# Patient Record
Sex: MALE | Race: WHITE | NOT HISPANIC OR LATINO | Employment: OTHER | URBAN - NONMETROPOLITAN AREA
[De-identification: names, ages, dates, MRNs, and addresses within clinical notes are randomized per-mention and may not be internally consistent; named-entity substitution may affect disease eponyms.]

---

## 2017-09-25 ENCOUNTER — OFFICE VISIT (OUTPATIENT)
Dept: PAIN MEDICINE | Facility: CLINIC | Age: 47
End: 2017-09-25

## 2017-09-25 VITALS
BODY MASS INDEX: 41.97 KG/M2 | HEIGHT: 72 IN | SYSTOLIC BLOOD PRESSURE: 130 MMHG | WEIGHT: 309.9 LBS | DIASTOLIC BLOOD PRESSURE: 90 MMHG

## 2017-09-25 DIAGNOSIS — M54.5 CHRONIC LOW BACK PAIN, UNSPECIFIED BACK PAIN LATERALITY, WITH SCIATICA PRESENCE UNSPECIFIED: ICD-10-CM

## 2017-09-25 DIAGNOSIS — Z98.890 HISTORY OF BACK SURGERY: ICD-10-CM

## 2017-09-25 DIAGNOSIS — M54.2 CERVICALGIA: ICD-10-CM

## 2017-09-25 DIAGNOSIS — M51.36 DDD (DEGENERATIVE DISC DISEASE), LUMBAR: Primary | ICD-10-CM

## 2017-09-25 DIAGNOSIS — G89.29 CHRONIC LOW BACK PAIN, UNSPECIFIED BACK PAIN LATERALITY, WITH SCIATICA PRESENCE UNSPECIFIED: ICD-10-CM

## 2017-09-25 DIAGNOSIS — M54.16 LUMBAR RADICULOPATHY: ICD-10-CM

## 2017-09-25 PROCEDURE — 99204 OFFICE O/P NEW MOD 45 MIN: CPT | Performed by: NURSE PRACTITIONER

## 2017-09-25 RX ORDER — LISINOPRIL AND HYDROCHLOROTHIAZIDE 20; 12.5 MG/1; MG/1
1 TABLET ORAL
COMMUNITY
Start: 2017-05-26 | End: 2020-02-18

## 2017-09-25 RX ORDER — OMEPRAZOLE 40 MG/1
40 CAPSULE, DELAYED RELEASE ORAL
COMMUNITY
Start: 2017-05-26 | End: 2020-02-18

## 2017-09-25 RX ORDER — TIZANIDINE 4 MG/1
2 TABLET ORAL 3 TIMES DAILY PRN
Qty: 90 TABLET | Refills: 1 | Status: SHIPPED | OUTPATIENT
Start: 2017-09-25 | End: 2017-10-25

## 2017-09-25 RX ORDER — DICLOFENAC SODIUM 75 MG/1
75 TABLET, DELAYED RELEASE ORAL 2 TIMES DAILY
Qty: 60 TABLET | Refills: 0 | Status: SHIPPED | OUTPATIENT
Start: 2017-09-25 | End: 2017-10-25

## 2017-09-25 RX ORDER — VENLAFAXINE 75 MG/1
75 TABLET ORAL
COMMUNITY
Start: 2017-05-26 | End: 2021-02-25

## 2017-09-25 RX ORDER — OXYCODONE AND ACETAMINOPHEN 7.5; 325 MG/1; MG/1
1 TABLET ORAL
COMMUNITY
End: 2021-02-25

## 2017-09-25 RX ORDER — AMLODIPINE BESYLATE 10 MG/1
10 TABLET ORAL
COMMUNITY
Start: 2017-05-26

## 2017-09-25 NOTE — PROGRESS NOTES
Joel Patton is a 46 y.o. male.   1970    HPI:   Location: lower back, left hip and left leg  Quality: burning, aching and throbbing  Severity: 4/10  Timing: constant  Alleviating: nothing  Aggravating: increased activity       Patient referred to pain management via Dr. Maria related to chronic lower back pain with reported left leg involvement. Pt with chronic lower back pain for 15 years- posterior left leg pain. Pt had MRI 2009- DDD and bulging disc. No loss of bowel or bladder.  Pt seen Emma Johnson. Laminectomy. Pt with opiate medications and PT. Eventually, left leg pain became worst. Pt followed up with Dr. Booker in March 2017. MRI performed and surgery 2nd laminectomy. Pt is interested in lumbar injections. Secondary, Pt reports cervical fusion via Dr. Chi. Pt with no complaints at this time. Explained in great detail history and physical, examination, ancillary physician notes and images reviewed, and pain management options.        The following portions of the patient's history were reviewed by me and updated as appropriate: allergies, current medications, past family history, past medical history, past social history, past surgical history and problem list.    Past Medical History:   Diagnosis Date   • Anxiety    • Depression    • High blood pressure    • Sleep apnea        Social History     Social History   • Marital status: Single     Spouse name: N/A   • Number of children: N/A   • Years of education: N/A     Occupational History   • Not on file.     Social History Main Topics   • Smoking status: Light Tobacco Smoker   • Smokeless tobacco: Never Used   • Alcohol use No   • Drug use: No   • Sexual activity: Defer     Other Topics Concern   • Not on file     Social History Narrative   • No narrative on file       Family History   Problem Relation Age of Onset   • Hypertension Mother    • Hypertension Father    • Cancer Paternal Grandfather    • Heart defect Paternal Grandfather           Current Outpatient Prescriptions:   •  amLODIPine (NORVASC) 10 MG tablet, Take 10 mg by mouth., Disp: , Rfl:   •  lisinopril-hydrochlorothiazide (PRINZIDE,ZESTORETIC) 20-12.5 MG per tablet, Take 1 tablet by mouth., Disp: , Rfl:   •  omeprazole (priLOSEC) 40 MG capsule, Take 40 mg by mouth., Disp: , Rfl:   •  oxyCODONE-acetaminophen (PERCOCET) 7.5-325 MG per tablet, Take 1 tablet by mouth., Disp: , Rfl:   •  venlafaxine (EFFEXOR) 75 MG tablet, Take 75 mg by mouth., Disp: , Rfl:   •  diclofenac (VOLTAREN) 75 MG EC tablet, Take 1 tablet by mouth 2 (Two) Times a Day for 30 days., Disp: 60 tablet, Rfl: 0  •  tiZANidine (ZANAFLEX) 4 MG tablet, Take 0.5 tablets by mouth 3 (Three) Times a Day As Needed for Muscle Spasms for up to 30 days., Disp: 90 tablet, Rfl: 1    No Known Allergies    Review of Systems   Musculoskeletal: Positive for back pain (LOWER).        Left hip pain left leg pain     All other systems reviewed and are negative.    All review of systems reviewed and negative except for above.    Physical Exam   Constitutional: He is oriented to person, place, and time. He appears well-developed and well-nourished.   HENT:   Head: Normocephalic and atraumatic.   Eyes: Conjunctivae and EOM are normal. Pupils are equal, round, and reactive to light.   Neck: Normal range of motion. Neck supple.   Cardiovascular: Normal rate and regular rhythm.    Pulmonary/Chest: Effort normal and breath sounds normal.   Abdominal: Soft. Bowel sounds are normal.   Musculoskeletal:        Cervical back: He exhibits decreased range of motion ( mild ext with cervical facet loading) and tenderness.        Lumbar back: He exhibits decreased range of motion ( flex < 90 deg and ext 10 deg with david facet loading) and tenderness.        Back:    Neurological: He is alert and oriented to person, place, and time. He displays normal reflexes. A sensory deficit ( occ left foot numbness) is present. No cranial nerve deficit. He exhibits  normal muscle tone. Coordination and gait normal.   Reflex Scores:       Tricep reflexes are 1+ on the right side and 1+ on the left side.       Bicep reflexes are 2+ on the right side and 2+ on the left side.       Brachioradialis reflexes are 2+ on the right side and 2+ on the left side.       Patellar reflexes are 1+ on the right side and 1+ on the left side.       Achilles reflexes are 1+ on the right side and 1+ on the left side.  Mild SLR left leg  Upper arm strength 5/5  Lower leg strength 5/5- left sided posterior pain down leg reported   Skin: Skin is warm and dry.   Psychiatric: He has a normal mood and affect. His behavior is normal. He expresses no homicidal and no suicidal ideation. He expresses no suicidal plans and no homicidal plans.   Nursing note and vitals reviewed.      Joel was seen today for back pain, hip pain and leg pain.    Diagnoses and all orders for this visit:    DDD (degenerative disc disease), lumbar  -     IR epidural block injection lumbar spine; Future  -     diclofenac (VOLTAREN) 75 MG EC tablet; Take 1 tablet by mouth 2 (Two) Times a Day for 30 days.  -     tiZANidine (ZANAFLEX) 4 MG tablet; Take 0.5 tablets by mouth 3 (Three) Times a Day As Needed for Muscle Spasms for up to 30 days.    Lumbar radiculopathy  -     IR epidural block injection lumbar spine; Future  -     diclofenac (VOLTAREN) 75 MG EC tablet; Take 1 tablet by mouth 2 (Two) Times a Day for 30 days.  -     tiZANidine (ZANAFLEX) 4 MG tablet; Take 0.5 tablets by mouth 3 (Three) Times a Day As Needed for Muscle Spasms for up to 30 days.    Chronic low back pain, unspecified back pain laterality, with sciatica presence unspecified  -     IR epidural block injection lumbar spine; Future  -     diclofenac (VOLTAREN) 75 MG EC tablet; Take 1 tablet by mouth 2 (Two) Times a Day for 30 days.  -     tiZANidine (ZANAFLEX) 4 MG tablet; Take 0.5 tablets by mouth 3 (Three) Times a Day As Needed for Muscle Spasms for up to 30  days.    History of back surgery    Cervicalgia        Medication: Discussed Voltaren 75mg EC bid and Zanaflex 2mg TID PRN, discussed and ordered.  Risk and precautions have been fully explained to the patient, who indicates understanding. Pt informed to not taken Motrin with Voltaren. No opiates will be Rx today- Will attempt non-opiate interventions first.        Interventional: I have discussed and ordered Transforminal Lumbar Epidural Steroid Injection Left sided L4-L5, L5-S1  I have discussed the risks and benefits of the procedure with the patient.  JEROMY and any neurological impairments discussed with patient that may need of emergency evaluation.      I have reviewed ancillary notes and images for today's examination;    2/8/2017 :MRI  1.  At the L5-S1 level, there is a 5.0 x 10.0 mm left-sided extruded disc fragment with investing epidural scar.  The impact on the left S1 nerve root is uncertain and needs clinical correlation.    2.  No spinal stenosis, right-sided nerve root compression, or arachnoiditis.   Result Narrative   Indication:  Pain in low back and left leg.  No specific injury.  Back surgery 5-6 years ago.    MRI, L-spine with/without 10 mL Gadovist:  The lumbar vertebrae have a normal signal and appearance.  The L1-2 disc level is unremarkable.  At the L2-3 level, there is a shallow central disc protrusion with slight superior migration behind the inferior   endplate of L2; no nerve root compression.    The L3-4 level is unremarkable.    The L4-5 level has mild degenerative change in the facets without complications.    At the L5-S1 level, there is a central disc protrusion and, in addition, an extruded left disc fragment measuring 1.0 x 8.0 mm that has investing epidural scar.  The proximal S1 nerve root is not separable from the disc fragment but below this there is   no abnormal enhancement and the nerve root appears normal.  There is no clumping or abnormal enhancement in the cauda  equina.  The conus has a normal position and appearance.  No retroperitoneal abnormality               Rehab: none at this time    Behavioral: No aberrant behavior noted. JASMY[facility] Report # [id number] was reviewed and is consistent with stated history    Urine drug screen None at this time    ORT: 2  Pt with controlled hx depression. No Si thoughts or ideas reported.     PHQ-9: 10          This document has been electronically signed by ANGEL Schmid on September 25, 2017 1:50 PM          This document has been electronically signed by ANGEL Schmid on September 25, 2017 1:50 PM

## 2017-10-16 ENCOUNTER — HOSPITAL ENCOUNTER (OUTPATIENT)
Dept: INTERVENTIONAL RADIOLOGY/VASCULAR | Facility: HOSPITAL | Age: 47
Discharge: HOME | End: 2017-10-16
Admitting: NURSE PRACTITIONER

## 2017-10-16 VITALS — HEART RATE: 88 BPM | OXYGEN SATURATION: 94 % | SYSTOLIC BLOOD PRESSURE: 156 MMHG | DIASTOLIC BLOOD PRESSURE: 104 MMHG

## 2017-10-16 DIAGNOSIS — M54.16 LUMBAR RADICULOPATHY: ICD-10-CM

## 2017-10-16 DIAGNOSIS — M51.36 DDD (DEGENERATIVE DISC DISEASE), LUMBAR: ICD-10-CM

## 2017-10-16 DIAGNOSIS — G89.29 CHRONIC LOW BACK PAIN, UNSPECIFIED BACK PAIN LATERALITY, WITH SCIATICA PRESENCE UNSPECIFIED: ICD-10-CM

## 2017-10-16 DIAGNOSIS — M54.5 CHRONIC LOW BACK PAIN, UNSPECIFIED BACK PAIN LATERALITY, WITH SCIATICA PRESENCE UNSPECIFIED: ICD-10-CM

## 2017-10-16 PROCEDURE — 25010000002 METHYLPREDNISOLONE PER 80 MG: Performed by: PAIN MEDICINE

## 2017-10-16 PROCEDURE — 0 IOPAMIDOL 41 % SOLUTION: Performed by: PAIN MEDICINE

## 2017-10-16 PROCEDURE — 62323 NJX INTERLAMINAR LMBR/SAC: CPT | Performed by: PAIN MEDICINE

## 2017-10-16 RX ORDER — LIDOCAINE HYDROCHLORIDE 10 MG/ML
INJECTION, SOLUTION EPIDURAL; INFILTRATION; INTRACAUDAL; PERINEURAL
Status: COMPLETED | OUTPATIENT
Start: 2017-10-16 | End: 2017-10-16

## 2017-10-16 RX ORDER — METHYLPREDNISOLONE ACETATE 80 MG/ML
INJECTION, SUSPENSION INTRA-ARTICULAR; INTRALESIONAL; INTRAMUSCULAR; SOFT TISSUE
Status: COMPLETED | OUTPATIENT
Start: 2017-10-16 | End: 2017-10-16

## 2017-10-16 RX ADMIN — METHYLPREDNISOLONE ACETATE 80 MG: 80 INJECTION, SUSPENSION INTRA-ARTICULAR; INTRALESIONAL; INTRAMUSCULAR; SOFT TISSUE at 13:18

## 2017-10-16 RX ADMIN — IOPAMIDOL 2 ML: 408 INJECTION, SOLUTION INTRATHECAL at 13:18

## 2017-10-16 RX ADMIN — LIDOCAINE HYDROCHLORIDE 2 ML: 10 INJECTION, SOLUTION EPIDURAL; INFILTRATION; INTRACAUDAL; PERINEURAL at 13:17

## 2017-11-14 ENCOUNTER — OFFICE VISIT (OUTPATIENT)
Dept: PAIN MEDICINE | Facility: CLINIC | Age: 47
End: 2017-11-14

## 2017-11-14 VITALS
SYSTOLIC BLOOD PRESSURE: 160 MMHG | WEIGHT: 315 LBS | DIASTOLIC BLOOD PRESSURE: 94 MMHG | BODY MASS INDEX: 42.66 KG/M2 | HEIGHT: 72 IN

## 2017-11-14 DIAGNOSIS — M54.16 LUMBAR RADICULOPATHY: ICD-10-CM

## 2017-11-14 DIAGNOSIS — M51.36 DDD (DEGENERATIVE DISC DISEASE), LUMBAR: Primary | ICD-10-CM

## 2017-11-14 PROCEDURE — 99213 OFFICE O/P EST LOW 20 MIN: CPT | Performed by: NURSE PRACTITIONER

## 2017-11-14 RX ORDER — TIZANIDINE 4 MG/1
4 TABLET ORAL EVERY 8 HOURS PRN
Qty: 90 TABLET | Refills: 1 | Status: SHIPPED | OUTPATIENT
Start: 2017-11-14 | End: 2017-12-14

## 2017-11-14 RX ORDER — DICLOFENAC SODIUM 75 MG/1
75 TABLET, DELAYED RELEASE ORAL 2 TIMES DAILY
Qty: 60 TABLET | Refills: 1 | Status: SHIPPED | OUTPATIENT
Start: 2017-11-14 | End: 2017-12-14

## 2017-11-14 NOTE — PROGRESS NOTES
Joel Patton is a 47 y.o. male.   1970    HPI:   Location: lower back, left hip and left leg  Quality: burning, aching and throbbing  Severity: 4/10  Timing: constant  Alleviating: nothing  Aggravating: increased activity     LESI helped back pain but still complaining of leg pain laterally.        The following portions of the patient's history were reviewed by me and updated as appropriate: allergies, current medications, past family history, past medical history, past social history, past surgical history and problem list.    Past Medical History:   Diagnosis Date   • Anxiety    • Depression    • High blood pressure    • Sleep apnea        Social History     Social History   • Marital status:      Spouse name: N/A   • Number of children: N/A   • Years of education: N/A     Occupational History   • Not on file.     Social History Main Topics   • Smoking status: Light Tobacco Smoker   • Smokeless tobacco: Never Used   • Alcohol use No   • Drug use: No   • Sexual activity: Defer     Other Topics Concern   • Not on file     Social History Narrative       Family History   Problem Relation Age of Onset   • Hypertension Mother    • Hypertension Father    • Cancer Paternal Grandfather    • Heart defect Paternal Grandfather          Current Outpatient Prescriptions:   •  amLODIPine (NORVASC) 10 MG tablet, Take 10 mg by mouth., Disp: , Rfl:   •  lisinopril-hydrochlorothiazide (PRINZIDE,ZESTORETIC) 20-12.5 MG per tablet, Take 1 tablet by mouth., Disp: , Rfl:   •  omeprazole (priLOSEC) 40 MG capsule, Take 40 mg by mouth., Disp: , Rfl:   •  oxyCODONE-acetaminophen (PERCOCET) 7.5-325 MG per tablet, Take 1 tablet by mouth., Disp: , Rfl:   •  venlafaxine (EFFEXOR) 75 MG tablet, Take 75 mg by mouth., Disp: , Rfl:   •  diclofenac (VOLTAREN) 75 MG EC tablet, Take 1 tablet by mouth 2 (Two) Times a Day for 30 days., Disp: 60 tablet, Rfl: 1  •  tiZANidine (ZANAFLEX) 4 MG tablet, Take 1 tablet by mouth Every 8 (Eight)  Hours As Needed for Muscle Spasms for up to 30 days., Disp: 90 tablet, Rfl: 1    No Known Allergies    Review of Systems   Musculoskeletal: Positive for back pain.        L.leg and l.hip pain     All other systems reviewed and are negative.    All systems reviewed and negative except for above.    Physical Exam   Constitutional: He appears well-developed and well-nourished. No distress.   Musculoskeletal:   Full strength left leg.  Mild left slr.    Neurological: He is alert.   Psychiatric: He has a normal mood and affect. His behavior is normal. Judgment normal.       Joel was seen today for back pain and pain.    Diagnoses and all orders for this visit:    DDD (degenerative disc disease), lumbar    Lumbar radiculopathy    Other orders  -     diclofenac (VOLTAREN) 75 MG EC tablet; Take 1 tablet by mouth 2 (Two) Times a Day for 30 days.  -     tiZANidine (ZANAFLEX) 4 MG tablet; Take 1 tablet by mouth Every 8 (Eight) Hours As Needed for Muscle Spasms for up to 30 days.      Medication: refill tizanidine and diclofenac as above.    Interventional:  I have discussed the risks and benefits of the procedure with the patient.  Left l5-1 transforaminal vin.  Pt will be called with appt for injection.   I explained that the pain clinic will be closing after the first of the year.  I suggested pt discuss what to do next with pcp and that pt may call back in about a month to find out if there is going to be a new clinic in the area.       Rehab: none at this time    Behavioral: No aberrant behavior noted. [facility]AS[facility] Report #[id number]  was reviewed and is consistent with stated history    Urine drug screen None at this time          This document has been electronically signed by Fidencio Celis MD on November 14, 2017 2:04 PM

## 2017-11-30 ENCOUNTER — HOSPITAL ENCOUNTER (OUTPATIENT)
Dept: INTERVENTIONAL RADIOLOGY/VASCULAR | Facility: HOSPITAL | Age: 47
Discharge: HOME | End: 2017-11-30
Attending: PAIN MEDICINE | Admitting: PAIN MEDICINE

## 2017-11-30 VITALS — SYSTOLIC BLOOD PRESSURE: 164 MMHG | HEART RATE: 88 BPM | DIASTOLIC BLOOD PRESSURE: 101 MMHG | OXYGEN SATURATION: 94 %

## 2017-11-30 DIAGNOSIS — M51.36 DDD (DEGENERATIVE DISC DISEASE), LUMBAR: ICD-10-CM

## 2017-11-30 DIAGNOSIS — M54.16 LUMBAR RADICULOPATHY: ICD-10-CM

## 2017-11-30 PROCEDURE — 0 IOPAMIDOL 41 % SOLUTION: Performed by: PAIN MEDICINE

## 2017-11-30 PROCEDURE — 64483 NJX AA&/STRD TFRM EPI L/S 1: CPT | Performed by: PAIN MEDICINE

## 2017-11-30 PROCEDURE — 25010000002 METHYLPREDNISOLONE PER 80 MG: Performed by: PAIN MEDICINE

## 2017-11-30 RX ORDER — METHYLPREDNISOLONE ACETATE 80 MG/ML
INJECTION, SUSPENSION INTRA-ARTICULAR; INTRALESIONAL; INTRAMUSCULAR; SOFT TISSUE
Status: COMPLETED | OUTPATIENT
Start: 2017-11-30 | End: 2017-11-30

## 2017-11-30 RX ORDER — LIDOCAINE HYDROCHLORIDE 10 MG/ML
INJECTION, SOLUTION EPIDURAL; INFILTRATION; INTRACAUDAL; PERINEURAL
Status: COMPLETED | OUTPATIENT
Start: 2017-11-30 | End: 2017-11-30

## 2017-11-30 RX ADMIN — IOPAMIDOL 2 ML: 408 INJECTION, SOLUTION INTRATHECAL at 13:14

## 2017-11-30 RX ADMIN — METHYLPREDNISOLONE ACETATE 80 MG: 80 INJECTION, SUSPENSION INTRA-ARTICULAR; INTRALESIONAL; INTRAMUSCULAR; SOFT TISSUE at 13:14

## 2017-11-30 RX ADMIN — LIDOCAINE HYDROCHLORIDE 2 ML: 10 INJECTION, SOLUTION EPIDURAL; INFILTRATION; INTRACAUDAL; PERINEURAL at 13:13

## 2020-02-18 ENCOUNTER — OFFICE VISIT (OUTPATIENT)
Dept: GASTROENTEROLOGY | Facility: CLINIC | Age: 50
End: 2020-02-18

## 2020-02-18 VITALS
HEIGHT: 72 IN | DIASTOLIC BLOOD PRESSURE: 81 MMHG | HEART RATE: 74 BPM | SYSTOLIC BLOOD PRESSURE: 125 MMHG | BODY MASS INDEX: 43.25 KG/M2

## 2020-02-18 DIAGNOSIS — Z80.0 FAMILY HISTORY OF COLON CANCER: ICD-10-CM

## 2020-02-18 DIAGNOSIS — R19.4 CHANGE IN BOWEL HABITS: ICD-10-CM

## 2020-02-18 DIAGNOSIS — Z12.11 ENCOUNTER FOR COLONOSCOPY IN PATIENT WITH FAMILY HISTORY OF COLON CANCER: ICD-10-CM

## 2020-02-18 DIAGNOSIS — R63.4 WEIGHT LOSS: ICD-10-CM

## 2020-02-18 DIAGNOSIS — K59.00 CONSTIPATION, UNSPECIFIED CONSTIPATION TYPE: Primary | ICD-10-CM

## 2020-02-18 DIAGNOSIS — Z80.0 ENCOUNTER FOR COLONOSCOPY IN PATIENT WITH FAMILY HISTORY OF COLON CANCER: ICD-10-CM

## 2020-02-18 PROBLEM — G62.9 POLYNEUROPATHY: Status: ACTIVE | Noted: 2020-02-18

## 2020-02-18 PROBLEM — E78.5 HYPERLIPEMIA: Status: ACTIVE | Noted: 2020-02-18

## 2020-02-18 PROBLEM — Z09 FOLLOW-UP EXAMINATION AFTER NEUROLOGICAL SURGERY: Status: ACTIVE | Noted: 2017-03-22

## 2020-02-18 PROBLEM — F32.0 DEPRESSION, MAJOR, SINGLE EPISODE, MILD (HCC): Status: ACTIVE | Noted: 2020-02-18

## 2020-02-18 PROBLEM — K21.9 GERD (GASTROESOPHAGEAL REFLUX DISEASE): Status: ACTIVE | Noted: 2020-02-18

## 2020-02-18 PROBLEM — G47.33 OBSTRUCTIVE SLEEP APNEA: Status: ACTIVE | Noted: 2020-02-18

## 2020-02-18 PROBLEM — R53.82 CHRONIC FATIGUE: Status: ACTIVE | Noted: 2018-06-13

## 2020-02-18 PROBLEM — I69.354 FLACCID HEMIPLEGIA OF LEFT NONDOMINANT SIDE AS LATE EFFECT OF CEREBRAL INFARCTION: Status: ACTIVE | Noted: 2020-02-18

## 2020-02-18 PROBLEM — M51.36 DEGENERATION OF INTERVERTEBRAL DISC OF LUMBAR REGION: Status: ACTIVE | Noted: 2017-05-26

## 2020-02-18 PROCEDURE — 99214 OFFICE O/P EST MOD 30 MIN: CPT | Performed by: NURSE PRACTITIONER

## 2020-02-18 RX ORDER — MODAFINIL 100 MG/1
200 TABLET ORAL DAILY
COMMUNITY

## 2020-02-18 RX ORDER — GABAPENTIN 300 MG/1
300 CAPSULE ORAL
COMMUNITY
Start: 2019-11-07

## 2020-02-18 RX ORDER — PANTOPRAZOLE SODIUM 40 MG/1
40 TABLET, DELAYED RELEASE ORAL
COMMUNITY

## 2020-02-18 RX ORDER — OXYCODONE HYDROCHLORIDE AND ACETAMINOPHEN 5; 325 MG/1; MG/1
1 TABLET ORAL
COMMUNITY
Start: 2019-10-31 | End: 2021-02-25

## 2020-02-18 RX ORDER — LISINOPRIL 10 MG/1
10 TABLET ORAL DAILY
COMMUNITY
End: 2021-02-25

## 2020-02-18 RX ORDER — SODIUM, POTASSIUM,MAG SULFATES 17.5-3.13G
1 SOLUTION, RECONSTITUTED, ORAL ORAL EVERY 12 HOURS
Qty: 2 BOTTLE | Refills: 0 | Status: SHIPPED | OUTPATIENT
Start: 2020-02-18 | End: 2020-02-25 | Stop reason: HOSPADM

## 2020-02-18 RX ORDER — ATORVASTATIN CALCIUM 40 MG/1
80 TABLET, FILM COATED ORAL
COMMUNITY
End: 2021-02-25

## 2020-02-18 RX ORDER — DEXTROSE AND SODIUM CHLORIDE 5; .45 G/100ML; G/100ML
30 INJECTION, SOLUTION INTRAVENOUS CONTINUOUS PRN
Status: CANCELLED | OUTPATIENT
Start: 2020-02-25

## 2020-02-18 RX ORDER — LISINOPRIL 40 MG/1
40 TABLET ORAL DAILY
COMMUNITY
End: 2020-02-18

## 2020-02-18 RX ORDER — LANOLIN ALCOHOL/MO/W.PET/CERES
15 CREAM (GRAM) TOPICAL NIGHTLY PRN
COMMUNITY

## 2020-02-18 RX ORDER — SENNOSIDES 8.6 MG
TABLET ORAL NIGHTLY
COMMUNITY

## 2020-02-18 RX ORDER — PHENAZOPYRIDINE HYDROCHLORIDE 100 MG/1
100 TABLET, FILM COATED ORAL 3 TIMES DAILY PRN
COMMUNITY

## 2020-02-18 RX ORDER — BISACODYL 10 MG
10 SUPPOSITORY, RECTAL RECTAL
COMMUNITY
End: 2021-02-25

## 2020-02-18 RX ORDER — SENNOSIDES 8.6 MG
650 CAPSULE ORAL
COMMUNITY

## 2020-02-18 NOTE — PATIENT INSTRUCTIONS
Constipation, Adult  Constipation is when a person has fewer bowel movements in a week than normal, has difficulty having a bowel movement, or has stools that are dry, hard, or larger than normal. Constipation may be caused by an underlying condition. It may become worse with age if a person takes certain medicines and does not take in enough fluids.  Follow these instructions at home:  Eating and drinking    · Eat foods that have a lot of fiber, such as fresh fruits and vegetables, whole grains, and beans.  · Limit foods that are high in fat, low in fiber, or overly processed, such as french fries, hamburgers, cookies, candies, and soda.  · Drink enough fluid to keep your urine clear or pale yellow.  General instructions  · Exercise regularly or as told by your health care provider.  · Go to the restroom when you have the urge to go. Do not hold it in.  · Take over-the-counter and prescription medicines only as told by your health care provider. These include any fiber supplements.  · Practice pelvic floor retraining exercises, such as deep breathing while relaxing the lower abdomen and pelvic floor relaxation during bowel movements.  · Watch your condition for any changes.  · Keep all follow-up visits as told by your health care provider. This is important.  Contact a health care provider if:  · You have pain that gets worse.  · You have a fever.  · You do not have a bowel movement after 4 days.  · You vomit.  · You are not hungry.  · You lose weight.  · You are bleeding from the anus.  · You have thin, pencil-like stools.  Get help right away if:  · You have a fever and your symptoms suddenly get worse.  · You leak stool or have blood in your stool.  · Your abdomen is bloated.  · You have severe pain in your abdomen.  · You feel dizzy or you faint.  This information is not intended to replace advice given to you by your health care provider. Make sure you discuss any questions you have with your health care  provider.  Document Released: 09/15/2005 Document Revised: 07/07/2017 Document Reviewed: 06/07/2017  [employer] Patient Education © 2019 [employer]

## 2020-02-18 NOTE — PROGRESS NOTES
Chief Complaint   Patient presents with   • Constipation   • Abdominal Pain       Subjective    Joel Patton is a 49 y.o. male. he is here today for follow-up.    History of Present Illness  49-year-old male presents to discuss chronic constipation abdominal pain.  Patient has hemiaplasia and hemiparesis followed by cerebral infarction affecting left nondominant side.  He resides in a skilled nursing facility currently he has not been responding to physical therapy so that has been canceled.  Reports he has been very constipated has had to use Senokot with other medications to have a bowel movement and it can be liquid like stool.  His weight continues to decrease patient does report he is not hungry.  Plan; schedule patient for colonoscopy due to family history of colon cancer we will also plan EGD due to decreased appetite and weight loss.     The following portions of the patient's history were reviewed and updated as appropriate:   Past Medical History:   Diagnosis Date   • Anxiety    • Depression    • GERD (gastroesophageal reflux disease)    • High blood pressure    • Sleep apnea    • Stroke (CMS/[facility]) 09/2013     Past Surgical History:   Procedure Laterality Date   • BACK SURGERY     • CARPAL TUNNEL RELEASE     • NECK SURGERY       Family History   Problem Relation Age of Onset   • Hypertension Mother    • Hypertension Father    • Cancer Paternal Grandfather    • Heart defect Paternal Grandfather        Prior to Admission medications    Medication Sig Start Date End Date Taking? Authorizing Provider   acetaminophen (TYLENOL) 650 MG 8 hr tablet Take 650 mg by mouth.   Yes Provider, MD Collin   amLODIPine (NORVASC) 10 MG tablet Take 10 mg by mouth. 5/26/17  Yes Provider, MD Collin   atorvastatin (LIPITOR) 40 MG tablet Take 80 mg by mouth.   Yes Provider, Historical, MD   bisacodyl (DULCOLAX) 10 MG suppository Insert 10 mg into the rectum.   Yes Provider, Historical, MD   diclofenac (VOLTAREN) 1 %  gel gel Apply 2 g topically to the appropriate area as directed.   Yes Collin Velazquez MD   gabapentin (NEURONTIN) 300 MG capsule Take 300 mg by mouth. 11/7/19  Yes Collin Velazquez MD   lisinopril (PRINIVIL,ZESTRIL) 10 MG tablet Take 10 mg by mouth Daily.   Yes Collin Velazquez MD   magnesium hydroxide (MILK OF MAGNESIA) 400 MG/5ML suspension Take 30 mL by mouth.   Yes Collin Velazquez MD   melatonin 3 MG tablet Take 3 mg by mouth.   Yes Collin Velazquez MD   modafinil (PROVIGIL) 100 MG tablet Take 100 mg by mouth Daily.   Yes Collin Velazquez MD   oxyCODONE-acetaminophen (PERCOCET) 5-325 MG per tablet Take 1 tablet by mouth. 10/31/19  Yes Collin Velazquez MD   oxyCODONE-acetaminophen (PERCOCET) 7.5-325 MG per tablet Take 1 tablet by mouth.   Yes Collin Velazquez MD   pantoprazole (PROTONIX) 40 MG EC tablet Take 40 mg by mouth.   Yes Collin Vealzquez MD   phenazopyridine (PYRIDIUM) 100 MG tablet Take 100 mg by mouth 3 (Three) Times a Day As Needed for Bladder Spasms.   Yes Collin Velazquez MD   senna (SENOKOT) 8.6 MG tablet tablet Take  by mouth Every Night.   Yes Collin Velazquez MD   venlafaxine (EFFEXOR) 75 MG tablet Take 75 mg by mouth. 5/26/17  Yes Collin Velazquez MD   Prucalopride Succinate (MOTEGRITY) 2 MG tablet Take 2 mg by mouth Daily. 2/18/20   Liana Fulton APRN   lisinopril (PRINIVIL,ZESTRIL) 40 MG tablet Take 40 mg by mouth Daily.  2/18/20  Collin Velazquez MD   lisinopril-hydrochlorothiazide (PRINZIDE,ZESTORETIC) 20-12.5 MG per tablet Take 1 tablet by mouth. 5/26/17 2/18/20  Collin Velazquez MD   omeprazole (priLOSEC) 40 MG capsule Take 40 mg by mouth. 5/26/17 2/18/20  Collin Velazquez MD     Allergies   Allergen Reactions   • Morphine Hallucinations     Social History     Socioeconomic History   • Marital status:      Spouse name: Not on file   • Number of children: Not on file   • Years of education: Not on file   •  "Highest education level: Not on file   Tobacco Use   • Smoking status: Light Tobacco Smoker   • Smokeless tobacco: Never Used   Substance and Sexual Activity   • Alcohol use: No   • Drug use: No   • Sexual activity: Defer       Review of Systems  Review of Systems   Constitutional: Positive for fatigue. Negative for activity change, appetite change, chills, diaphoresis, fever and unexpected weight change.   HENT: Negative for sore throat and trouble swallowing.    Respiratory: Negative for shortness of breath.    Gastrointestinal: Positive for constipation (goes 5-8 days between bowel movements then alternates with diarrhea for 2-3 days ) and diarrhea. Negative for abdominal distention, abdominal pain, anal bleeding, blood in stool, nausea, rectal pain and vomiting.   Musculoskeletal: Negative for arthralgias.   Skin: Negative for pallor.   Neurological: Positive for facial asymmetry, speech difficulty and weakness. Negative for light-headedness.        /81 (BP Location: Right arm)   Pulse 74   Ht 182.9 cm (72\")   BMI 43.25 kg/m²     Objective    Physical Exam   Constitutional: He is oriented to person, place, and time. He appears well-developed and well-nourished. He is cooperative. No distress.   HENT:   Head: Normocephalic and atraumatic.   Neck: Normal range of motion. Neck supple. No thyromegaly present.   Cardiovascular: Normal rate, regular rhythm and normal heart sounds.   Pulmonary/Chest: Effort normal and breath sounds normal. He has no wheezes. He has no rhonchi. He has no rales.   Abdominal: Soft. Normal appearance and bowel sounds are normal. He exhibits no distension. There is no hepatosplenomegaly. There is no tenderness. There is no rigidity and no guarding. No hernia.   Lymphadenopathy:     He has no cervical adenopathy.   Neurological: He is alert and oriented to person, place, and time.   Skin: Skin is warm, dry and intact. No rash noted. No pallor.   Psychiatric: His speech is slurred. " "He is slowed. He exhibits a depressed mood.     Conversion Encounter on 02/20/2007   Component Date Value Ref Range Status   • Spec Descr 1 02/20/2007 SPECIMEN(S): A DISC   Final   • Clinical Information 02/20/2007    Final                    Value:  CLINICAL HISTORY:  None Given     • Gross Description 02/20/2007    Final                    Value:  GROSS DESCRIPTION:  The specimen is labeled \"#1 disc C6-7\" and consists of fragments of  shaggy white and gray tissue measuring 3.0 x 3.0 x 1.0 cm together.  A  few fragments of bone are also included.  Sections are submitted in one  block.     • Final Diagnosis 02/20/2007    Final                    Value:  FINAL DIAGNOSIS:  INTERVERTEBRAL DISC, C6-C7, DISCECTOMY:       DEGENERATIVE FIBROCARTILAGE, SKELETAL MUSCLE, AND BONE.    DIAGNOSIS CODE:  5     • Comment 02/20/2007    Final                    Value:  CLINICAL DIAGNOSIS:  Disc     • CONVERTED (HISTORICAL) FINAL PATHO* 02/20/2007    Final                    Value:Diagnostician:  FRANKLIN RUCKER M.D.  Pathologist  Electronically Signed 02/22/2007       Assessment/Plan      1. Constipation, unspecified constipation type    2. Encounter for colonoscopy in patient with family history of colon cancer    3. Weight loss    4. Family history of colon cancer    5. Change in bowel habits    .       Orders placed during this encounter include:  Orders Placed This Encounter   Procedures   • Follow Anesthesia Guidelines / Standing Orders     Standing Status:   Future   • Obtain Informed Consent     Standing Status:   Future     Order Specific Question:   Informed Consent Given For     Answer:   COLONOSCOPY       COLONOSCOPY (N/A), ESOPHAGOGASTRODUODENOSCOPY (N/A)    Review and/or summary of lab tests, radiology, procedures, medications. Review and summary of old records and obtaining of history. The risks and benefits of my recommendations, as well as other treatment options were discussed with the patient today. Questions were " "answered.    New Medications Ordered This Visit   Medications   • Prucalopride Succinate (MOTEGRITY) 2 MG tablet     Sig: Take 2 mg by mouth Daily.     Dispense:  30 tablet     Refill:  2   • sodium-potassium-magnesium sulfates (SUPREP BOWEL PREP KIT) 17.5-3.13-1.6 GM/177ML solution oral solution     Sig: Take 1 bottle by mouth Every 12 (Twelve) Hours.     Dispense:  2 bottle     Refill:  0       Follow-up: Return in about 4 weeks (around 3/17/2020) for After test.          This document has been electronically signed by ANGEL Burgos on February 20, 2020 4:08 PM             Results for orders placed or performed in visit on 02/20/07   Converted Surgical Pathology   Result Value Ref Range    Spec Descr 1 SPECIMEN(S): A DISC     Clinical Information   CLINICAL HISTORY:  None Given       Gross Description         GROSS DESCRIPTION:  The specimen is labeled \"#1 disc C6-7\" and consists of fragments of  shaggy white and gray tissue measuring 3.0 x 3.0 x 1.0 cm together.  A  few fragments of bone are also included.  Sections are submitted in one  block.      Final Diagnosis         FINAL DIAGNOSIS:  INTERVERTEBRAL DISC, C6-C7, DISCECTOMY:       DEGENERATIVE FIBROCARTILAGE, SKELETAL MUSCLE, AND BONE.    DIAGNOSIS CODE:  5      Comment   CLINICAL DIAGNOSIS:  Disc       CONVERTED (HISTORICAL) FINAL PATHOLOGIST       Diagnostician:  FRANKLIN RUCKER M.D.  Pathologist  Electronically Signed 02/22/2007     Results for orders placed or performed in visit on 07/05/06   Converted Surgical Pathology   Result Value Ref Range    Spec Descr 1 SPECIMEN(S): A LESION     Clinical Information   CLINICAL HISTORY:  None Given       Gross Description         GROSS DESCRIPTION:  The container has an elliptical wedge of white skin measuring 1.0 x 0.5  x 0.4 cm.  Its surface has a light gray papule measuring 0.5 x 0.4 x  less than 0.1 cm.  The surgical margin is now marked with ink.  All  transverse sections are embedded.  A1 one end of " specimen; A2 other end  of specimen.      Final Diagnosis         FINAL DIAGNOSIS:  SKIN, BACK:       DERMATOFIBROMA.    DIAGNOSIS CODE:  8      Comment   CLINICAL DIAGNOSIS:  Lesion       CONVERTED (HISTORICAL) FINAL PATHOLOGIST       Diagnostician:  ADONSI CARMONA M.D.  Pathologist  Electronically Signed 07/06/2006

## 2020-02-20 PROBLEM — R19.4 CHANGE IN BOWEL HABITS: Status: ACTIVE | Noted: 2020-02-18

## 2020-02-20 PROBLEM — R63.4 WEIGHT LOSS: Status: ACTIVE | Noted: 2020-02-18

## 2020-02-20 PROBLEM — Z80.0 FAMILY HISTORY OF COLON CANCER: Status: ACTIVE | Noted: 2020-02-18

## 2020-02-25 ENCOUNTER — HOSPITAL ENCOUNTER (OUTPATIENT)
Facility: HOSPITAL | Age: 50
Setting detail: HOSPITAL OUTPATIENT SURGERY
Discharge: HOME | End: 2020-02-25
Attending: INTERNAL MEDICINE | Admitting: INTERNAL MEDICINE

## 2020-02-25 ENCOUNTER — ANESTHESIA (OUTPATIENT)
Dept: GASTROENTEROLOGY | Facility: HOSPITAL | Age: 50
End: 2020-02-25

## 2020-02-25 ENCOUNTER — ANESTHESIA EVENT (OUTPATIENT)
Dept: GASTROENTEROLOGY | Facility: HOSPITAL | Age: 50
End: 2020-02-25

## 2020-02-25 VITALS
OXYGEN SATURATION: 97 % | BODY MASS INDEX: 34.18 KG/M2 | WEIGHT: 252 LBS | TEMPERATURE: 96.3 F | HEART RATE: 78 BPM | DIASTOLIC BLOOD PRESSURE: 95 MMHG | RESPIRATION RATE: 18 BRPM | SYSTOLIC BLOOD PRESSURE: 160 MMHG

## 2020-02-25 DIAGNOSIS — Z80.0 ENCOUNTER FOR COLONOSCOPY IN PATIENT WITH FAMILY HISTORY OF COLON CANCER: ICD-10-CM

## 2020-02-25 DIAGNOSIS — Z80.0 FAMILY HISTORY OF COLON CANCER: ICD-10-CM

## 2020-02-25 DIAGNOSIS — R63.4 WEIGHT LOSS: ICD-10-CM

## 2020-02-25 DIAGNOSIS — Z12.11 ENCOUNTER FOR COLONOSCOPY IN PATIENT WITH FAMILY HISTORY OF COLON CANCER: ICD-10-CM

## 2020-02-25 DIAGNOSIS — R19.4 CHANGE IN BOWEL HABITS: ICD-10-CM

## 2020-02-25 PROCEDURE — G0105 COLORECTAL SCRN; HI RISK IND: HCPCS | Performed by: INTERNAL MEDICINE

## 2020-02-25 PROCEDURE — 88305 TISSUE EXAM BY PATHOLOGIST: CPT | Performed by: INTERNAL MEDICINE

## 2020-02-25 PROCEDURE — 43239 EGD BIOPSY SINGLE/MULTIPLE: CPT | Performed by: INTERNAL MEDICINE

## 2020-02-25 PROCEDURE — 25010000002 PROPOFOL 10 MG/ML EMULSION: Performed by: NURSE ANESTHETIST, CERTIFIED REGISTERED

## 2020-02-25 PROCEDURE — 88305 TISSUE EXAM BY PATHOLOGIST: CPT | Performed by: PATHOLOGY

## 2020-02-25 RX ORDER — PROPOFOL 10 MG/ML
VIAL (ML) INTRAVENOUS AS NEEDED
Status: DISCONTINUED | OUTPATIENT
Start: 2020-02-25 | End: 2020-02-25 | Stop reason: SURG

## 2020-02-25 RX ORDER — LIDOCAINE HYDROCHLORIDE 20 MG/ML
INJECTION, SOLUTION INTRAVENOUS AS NEEDED
Status: DISCONTINUED | OUTPATIENT
Start: 2020-02-25 | End: 2020-02-25 | Stop reason: SURG

## 2020-02-25 RX ORDER — DEXTROSE AND SODIUM CHLORIDE 5; .45 G/100ML; G/100ML
30 INJECTION, SOLUTION INTRAVENOUS CONTINUOUS PRN
Status: DISCONTINUED | OUTPATIENT
Start: 2020-02-25 | End: 2020-02-25 | Stop reason: HOSPADM

## 2020-02-25 RX ADMIN — DEXTROSE AND SODIUM CHLORIDE 30 ML/HR: 5; 450 INJECTION, SOLUTION INTRAVENOUS at 15:34

## 2020-02-25 RX ADMIN — PROPOFOL 30 MG: 10 INJECTION, EMULSION INTRAVENOUS at 16:12

## 2020-02-25 RX ADMIN — PROPOFOL 30 MG: 10 INJECTION, EMULSION INTRAVENOUS at 16:10

## 2020-02-25 RX ADMIN — PROPOFOL 100 MG: 10 INJECTION, EMULSION INTRAVENOUS at 16:07

## 2020-02-25 RX ADMIN — PROPOFOL 20 MG: 10 INJECTION, EMULSION INTRAVENOUS at 16:09

## 2020-02-25 RX ADMIN — LIDOCAINE HYDROCHLORIDE 100 MG: 20 INJECTION, SOLUTION INTRAVENOUS at 16:07

## 2020-02-25 RX ADMIN — PROPOFOL 20 MG: 10 INJECTION, EMULSION INTRAVENOUS at 16:17

## 2020-02-25 NOTE — ANESTHESIA PREPROCEDURE EVALUATION
Anesthesia Evaluation     Patient summary reviewed   NPO Solid Status: > 8 hours  NPO Liquid Status: > 2 hours           Airway   Mallampati: III  TM distance: >3 FB  Neck ROM: full  No difficulty expected  Dental      Pulmonary - normal exam   (+) sleep apnea,   Cardiovascular - normal exam    (+) hypertension, hyperlipidemia,       Neuro/Psych  (+) CVA, numbness,     GI/Hepatic/Renal/Endo    (+)  GERD,      Musculoskeletal     Abdominal  - normal exam   Substance History      OB/GYN          Other   arthritis,                      Anesthesia Plan    ASA 3     MAC     intravenous induction     Anesthetic plan, all risks, benefits, and alternatives have been provided, discussed and informed consent has been obtained with: patient.

## 2020-02-25 NOTE — ANESTHESIA POSTPROCEDURE EVALUATION
Patient: Joel Patton    Procedure Summary     Date:  02/25/20 Room / Location:  [facility] ENDOSCOPY 2 / [facility] ENDOSCOPY    Anesthesia Start:  1557 Anesthesia Stop:  1624    Procedures:       COLONOSCOPY (N/A )      ESOPHAGOGASTRODUODENOSCOPY (N/A ) Diagnosis:       Family history of colon cancer      Weight loss      Change in bowel habits      (Encounter for colonoscopy in patient with family history of colon cancer [Z12.11, Z80.0])    Surgeon:  Yaron Martínez MD Provider:  Christiano Hudson CRNA    Anesthesia Type:  MAC ASA Status:  3          Anesthesia Type: MAC    Vitals  No vitals data found for the desired time range.          Post Anesthesia Care and Evaluation    Patient location during evaluation: bedside  Patient participation: complete - patient participated  Level of consciousness: awake and alert  Pain score: 0  Pain management: adequate  Airway patency: patent  Anesthetic complications: No anesthetic complications  PONV Status: none  Cardiovascular status: acceptable  Respiratory status: acceptable and spontaneous ventilation  Hydration status: acceptable

## 2020-02-27 LAB
LAB AP CASE REPORT: NORMAL
PATH REPORT.FINAL DX SPEC: NORMAL
PATH REPORT.GROSS SPEC: NORMAL

## 2020-02-28 ENCOUNTER — TELEPHONE (OUTPATIENT)
Dept: GASTROENTEROLOGY | Facility: CLINIC | Age: 50
End: 2020-02-28

## 2020-02-28 NOTE — TELEPHONE ENCOUNTER
Attempted to contact patients wife regarding motegrity. There was no answer, left her a voicemail instructing her to  medication here In the office.

## 2020-03-03 ENCOUNTER — OFFICE VISIT (OUTPATIENT)
Dept: GASTROENTEROLOGY | Facility: CLINIC | Age: 50
End: 2020-03-03

## 2020-03-03 VITALS
BODY MASS INDEX: 34.18 KG/M2 | SYSTOLIC BLOOD PRESSURE: 132 MMHG | HEART RATE: 73 BPM | HEIGHT: 72 IN | DIASTOLIC BLOOD PRESSURE: 86 MMHG

## 2020-03-03 DIAGNOSIS — Z78.9 IMPAIRED MOBILITY AND ACTIVITIES OF DAILY LIVING: ICD-10-CM

## 2020-03-03 DIAGNOSIS — K29.50 CHRONIC GASTRITIS WITHOUT BLEEDING, UNSPECIFIED GASTRITIS TYPE: ICD-10-CM

## 2020-03-03 DIAGNOSIS — K59.04 CHRONIC IDIOPATHIC CONSTIPATION: Primary | ICD-10-CM

## 2020-03-03 DIAGNOSIS — Z74.09 IMPAIRED MOBILITY AND ACTIVITIES OF DAILY LIVING: ICD-10-CM

## 2020-03-03 PROCEDURE — 99214 OFFICE O/P EST MOD 30 MIN: CPT | Performed by: NURSE PRACTITIONER

## 2020-03-03 RX ORDER — LUBIPROSTONE 8 UG/1
8 CAPSULE ORAL 2 TIMES DAILY WITH MEALS
Qty: 60 CAPSULE | Refills: 5 | Status: SHIPPED | OUTPATIENT
Start: 2020-03-03

## 2020-03-03 NOTE — PROGRESS NOTES
Chief Complaint   Patient presents with   • Constipation   • Abdominal Pain       Subjective    Joel Patton is a 49 y.o. male. he is here today for follow-up.  49-year-old male presents for follow-up to discuss EGD and colonoscopy results.  He continues to lose weight reports he just does not have much of an appetite and does not like the facility staying at food.  Reports he is often sat in chair for 4 hours before he is removed back to his bed where he lays there for hours on end.  They canceled his physical therapy he is not able to be active due to recent stroke.  States he does drink plenty of water throughout the day.  Weight is down 6 pounds based on facility weight documented as 246 pounds recently at his facility.  States when he tried Motegrity samples that worked well and he was able to have a bowel movement every other day he ran out of samples and insurance would not cover that medication so now he is having a bowel movement twice a week.  He denies any melena or hematochezia.  Reports always dull achy pain in his abdomen.  Primary care provider initially tried him on Linzess however he reports he had no improvement in symptoms with that.    Constipation   This is a chronic problem. The current episode started more than 1 month ago. The problem has been waxing and waning since onset. His stool frequency is 2 to 3 times per week. The patient is not on a high fiber diet. He does not exercise regularly. There has been adequate water intake. Associated symptoms include weight loss. Pertinent negatives include no abdominal pain, anorexia, back pain, bloating, diarrhea, fecal incontinence, fever, flatus, hematochezia, hemorrhoids, nausea, rectal pain or vomiting. Risk factors: Stroke in September in Rehab facility    Abdominal Pain   Associated symptoms include weight loss. Pertinent negatives include no anorexia, arthralgias, constipation, diarrhea, fever, flatus, hematochezia, nausea or vomiting.          EGD noted mildly severe esophagitis, gastritis and normal duodenum.  Antrum biopsy noted reactive gastropathy.  Distal esophagus biopsy noted reactive change of squamous mucosa.  Colonoscopy had adequate prep noted normal perianal digital rectal exam external/internal hemorrhoids were found.  Repeat is recommended in 5 years for surveillance.       The following portions of the patient's history were reviewed and updated as appropriate:   Past Medical History:   Diagnosis Date   • Anxiety    • Depression    • GERD (gastroesophageal reflux disease)    • High blood pressure    • Sleep apnea    • Stroke (CMS/[facility]) 09/2018    L side paralysis     Past Surgical History:   Procedure Laterality Date   • BACK SURGERY     • CARDIAC SURGERY  09/2019    loop recorder   • CARPAL TUNNEL RELEASE     • NECK SURGERY       Family History   Problem Relation Age of Onset   • Hypertension Mother    • Hypertension Father    • Cancer Paternal Grandfather    • Heart defect Paternal Grandfather        Prior to Admission medications    Medication Sig Start Date End Date Taking? Authorizing Provider   acetaminophen (TYLENOL) 650 MG 8 hr tablet Take 650 mg by mouth.   Yes Collin Velazquez MD   amLODIPine (NORVASC) 10 MG tablet Take 10 mg by mouth. 5/26/17  Yes Collin Velazquez MD   atorvastatin (LIPITOR) 40 MG tablet Take 80 mg by mouth.   Yes Collin Velazquez MD   bisacodyl (DULCOLAX) 10 MG suppository Insert 10 mg into the rectum.   Yes Collin Velazquez MD   diclofenac (VOLTAREN) 1 % gel gel Apply 2 g topically to the appropriate area as directed.   Yes Collin Velazquez MD   gabapentin (NEURONTIN) 300 MG capsule Take 300 mg by mouth. 11/7/19  Yes Collin Velazquez MD   lisinopril (PRINIVIL,ZESTRIL) 10 MG tablet Take 10 mg by mouth Daily.   Yes Collin Velazquez MD   magnesium hydroxide (MILK OF MAGNESIA) 400 MG/5ML suspension Take 30 mL by mouth.   Yes Collin Velazquez MD   melatonin 3 MG tablet Take  3 mg by mouth.   Yes Provider, MD Collin   modafinil (PROVIGIL) 100 MG tablet Take 100 mg by mouth Daily.   Yes ProviderCollin MD   oxyCODONE-acetaminophen (PERCOCET) 5-325 MG per tablet Take 1 tablet by mouth. 10/31/19  Yes Collin Velazquez MD   oxyCODONE-acetaminophen (PERCOCET) 7.5-325 MG per tablet Take 1 tablet by mouth.   Yes ProviderCollin MD   pantoprazole (PROTONIX) 40 MG EC tablet Take 40 mg by mouth.   Yes Provider, MD Collin   phenazopyridine (PYRIDIUM) 100 MG tablet Take 100 mg by mouth 3 (Three) Times a Day As Needed for Bladder Spasms.   Yes ProviderCollin MD   senna (SENOKOT) 8.6 MG tablet tablet Take  by mouth Every Night.   Yes ProviderCollin MD   venlafaxine (EFFEXOR) 75 MG tablet Take 75 mg by mouth. 5/26/17  Yes Collin Velazquez MD   Prucalopride Succinate (MOTEGRITY) 2 MG tablet Take 2 mg by mouth Daily. 2/18/20 3/3/20 Yes Liana Fulton APRN   lubiprostone (AMITIZA) 8 MCG capsule Take 1 capsule by mouth 2 (Two) Times a Day With Meals. 3/3/20   Liana Fulton APRN     Allergies   Allergen Reactions   • Morphine Hallucinations     Social History     Socioeconomic History   • Marital status:      Spouse name: Not on file   • Number of children: Not on file   • Years of education: Not on file   • Highest education level: Not on file   Tobacco Use   • Smoking status: Light Tobacco Smoker   • Smokeless tobacco: Never Used   Substance and Sexual Activity   • Alcohol use: No   • Drug use: No   • Sexual activity: Defer       Review of Systems  Review of Systems   Constitutional: Positive for appetite change, unexpected weight change and weight loss. Negative for activity change, chills, diaphoresis, fatigue and fever.   HENT: Negative for sore throat and trouble swallowing.    Respiratory: Negative for shortness of breath.    Gastrointestinal: Negative for abdominal distention, abdominal pain, anal bleeding, anorexia, bloating, blood in stool,  "constipation, diarrhea, flatus, hematochezia, hemorrhoids, nausea, rectal pain and vomiting.   Musculoskeletal: Negative for arthralgias and back pain.   Skin: Negative for pallor.   Neurological: Negative for light-headedness.        /86 (BP Location: Left arm)   Pulse 73   Ht 182.9 cm (72\")   BMI 34.18 kg/m²     Objective    Physical Exam   Constitutional: He is oriented to person, place, and time. He appears well-developed and well-nourished. He is cooperative. No distress.   HENT:   Head: Normocephalic and atraumatic.   Neck: Normal range of motion. Neck supple. No thyromegaly present.   Cardiovascular: Normal rate, regular rhythm and normal heart sounds.   Pulmonary/Chest: Effort normal and breath sounds normal. He has no wheezes. He has no rhonchi. He has no rales.   Abdominal: Soft. Normal appearance and bowel sounds are normal. He exhibits no distension. There is no hepatosplenomegaly. There is generalized tenderness. There is no rigidity and no guarding. No hernia.   Lymphadenopathy:     He has no cervical adenopathy.   Neurological: He is alert and oriented to person, place, and time.   Skin: Skin is warm, dry and intact. No rash noted. No pallor.   Psychiatric: He has a normal mood and affect. His speech is normal.     Admission on 02/25/2020, Discharged on 02/25/2020   Component Date Value Ref Range Status   • Case Report 02/25/2020    Final                    Value:Surgical Pathology Report                         Case: [id number]                                  Authorizing Provider:  Yaron Martínez MD        Collected:           02/25/2020 04:12 PM          Ordering Location:     [facility]             Received:            02/26/2020 06:47 AM                                 [place] [facility]                                                     Pathologist:           Alan Cid MD                                                         Specimens:   1) - Gastric, Antrum       "                                                                          2) - Esophagus, Distal                                                                    • Final Diagnosis 02/25/2020    Final                    Value:This result contains rich text formatting which cannot be displayed here.   • Gross Description 02/25/2020    Final                    Value:This result contains rich text formatting which cannot be displayed here.     Assessment/Plan      1. Chronic idiopathic constipation    2. Chronic gastritis without bleeding, unspecified gastritis type    3. Impaired mobility and activities of daily living    .   Will discontinue Motegrity as it is not covered and start patient on Amitiza twice daily.  Discussed that we can increase dose he will contact office if this dose is not working and increase it.  Will order physical therapy for patient as increased activity mobility will help with chronic constipation.    Orders placed during this encounter include:  Orders Placed This Encounter   Procedures   • Ambulatory Referral to Physical Therapy Evaluate and treat; Strengthening, ROM, Stretching; Left     Referral Priority:   Routine     Referral Type:   Therapy     Referral Reason:   Specialty Services Required     Requested Specialty:   Physical Therapy     Number of Visits Requested:   1       * Surgery not found *    Review and/or summary of lab tests, radiology, procedures, medications. Review and summary of old records and obtaining of history. The risks and benefits of my recommendations, as well as other treatment options were discussed with the patient today. Questions were answered.    New Medications Ordered This Visit   Medications   • lubiprostone (AMITIZA) 8 MCG capsule     Sig: Take 1 capsule by mouth 2 (Two) Times a Day With Meals.     Dispense:  60 capsule     Refill:  5       Follow-up: Return in about 4 weeks (around 3/31/2020) for Recheck.          This document has been electronically  "signed by ANGEL Burgos on March 3, 2020 11:55 AM             Results for orders placed or performed during the hospital encounter of 02/25/20   Tissue Pathology Exam   Result Value Ref Range    Case Report       Surgical Pathology Report                         Case: [id number]                                  Authorizing Provider:  Yaron Martínez MD        Collected:           02/25/2020 04:12 PM          Ordering Location:     [facility]             Received:            02/26/2020 06:47 AM                                 [place] [facility]                                                     Pathologist:           Alan Cid MD                                                         Specimens:   1) - Gastric, Antrum                                                                                2) - Esophagus, Distal                                                                     Final Diagnosis       1.  MUCOSA, ANTRUM OF STOMACH:  REACTIVE GASTROPATHY.    2.  MUCOSA, DISTAL ESOPHAGUS:  REACTIVE CHANGE OF SQUAMOUS MUCOSA.      Gross Description       1.  The first container is labeled \"antrum of stomach\" and has a nodular fragment of white soft tissue measuring 0.3 cm in greatest dimension.  It is entirely embedded as 1A.    2.  The second container is labeled \"distal esophagus\" and has nodular bits of white soft tissue measuring 0.4 cc in aggregate.  The entire specimen is embedded as 2A.     Results for orders placed or performed in visit on 02/20/07   Converted Surgical Pathology   Result Value Ref Range    Spec Descr 1 SPECIMEN(S): A DISC     Clinical Information   CLINICAL HISTORY:  None Given       Gross Description         GROSS DESCRIPTION:  The specimen is labeled \"#1 disc C6-7\" and consists of fragments of  shaggy white and gray tissue measuring 3.0 x 3.0 x 1.0 cm together.  A  few fragments of bone are also included.  Sections are submitted in one  block.      Final Diagnosis "         FINAL DIAGNOSIS:  INTERVERTEBRAL DISC, C6-C7, DISCECTOMY:       DEGENERATIVE FIBROCARTILAGE, SKELETAL MUSCLE, AND BONE.    DIAGNOSIS CODE:  5      Comment   CLINICAL DIAGNOSIS:  Disc       CONVERTED (HISTORICAL) FINAL PATHOLOGIST       Diagnostician:  FRANKLIN RUCKER M.D.  Pathologist  Electronically Signed 02/22/2007     Results for orders placed or performed in visit on 07/05/06   Converted Surgical Pathology   Result Value Ref Range    Spec Descr 1 SPECIMEN(S): A LESION     Clinical Information   CLINICAL HISTORY:  None Given       Gross Description         GROSS DESCRIPTION:  The container has an elliptical wedge of white skin measuring 1.0 x 0.5  x 0.4 cm.  Its surface has a light gray papule measuring 0.5 x 0.4 x  less than 0.1 cm.  The surgical margin is now marked with ink.  All  transverse sections are embedded.  A1 one end of specimen; A2 other end  of specimen.      Final Diagnosis         FINAL DIAGNOSIS:  SKIN, BACK:       DERMATOFIBROMA.    DIAGNOSIS CODE:  8      Comment   CLINICAL DIAGNOSIS:  Lesion       CONVERTED (HISTORICAL) FINAL PATHOLOGIST       Diagnostician:  ADONIS CARMONA M.D.  Pathologist  Electronically Signed 07/06/2006

## 2020-03-03 NOTE — PATIENT INSTRUCTIONS

## 2020-03-06 ENCOUNTER — TELEPHONE (OUTPATIENT)
Dept: GASTROENTEROLOGY | Facility: CLINIC | Age: 50
End: 2020-03-06

## 2020-03-06 NOTE — TELEPHONE ENCOUNTER
Attempted to contact [facility] rehab to make them aware of pa approval for amitiza.There was no answer. Patients prescription insurance information is as follows:    ID- [id number]  [facility]- [address][id number]  [id number]    Phone number [phone]

## 2020-11-21 ENCOUNTER — LAB REQUISITION (OUTPATIENT)
Dept: LAB | Facility: HOSPITAL | Age: 50
End: 2020-11-21

## 2020-11-21 DIAGNOSIS — R82.5 ELEVATED URINE LEVELS OF DRUGS, MEDICAMENTS AND BIOLOGICAL SUBSTANCES: ICD-10-CM

## 2020-11-21 LAB
AMPHET+METHAMPHET UR QL: NEGATIVE
AMPHETAMINES UR QL: NEGATIVE
BARBITURATES UR QL SCN: NEGATIVE
BENZODIAZ UR QL SCN: NEGATIVE
BUPRENORPHINE SERPL-MCNC: NEGATIVE NG/ML
CANNABINOIDS SERPL QL: POSITIVE
COCAINE UR QL: NEGATIVE
METHADONE UR QL SCN: NEGATIVE
OPIATES UR QL: NEGATIVE
OXYCODONE UR QL SCN: NEGATIVE
PCP UR QL SCN: NEGATIVE
PROPOXYPH UR QL: NEGATIVE
TRICYCLICS UR QL SCN: NEGATIVE

## 2020-11-21 PROCEDURE — 80306 DRUG TEST PRSMV INSTRMNT: CPT | Performed by: FAMILY MEDICINE

## 2020-11-26 ENCOUNTER — LAB REQUISITION (OUTPATIENT)
Dept: LAB | Facility: HOSPITAL | Age: 50
End: 2020-11-26

## 2020-11-26 DIAGNOSIS — D64.9 ANEMIA, UNSPECIFIED: ICD-10-CM

## 2020-11-26 DIAGNOSIS — I10 ESSENTIAL (PRIMARY) HYPERTENSION: ICD-10-CM

## 2020-11-26 LAB
ALBUMIN SERPL-MCNC: 4.2 G/DL (ref 3.5–5.2)
ALBUMIN/GLOB SERPL: 1.4 G/DL
ALP SERPL-CCNC: 109 U/L (ref 39–117)
ALT SERPL W P-5'-P-CCNC: 32 U/L (ref 1–41)
ANION GAP SERPL CALCULATED.3IONS-SCNC: 11 MMOL/L (ref 5–15)
AST SERPL-CCNC: 19 U/L (ref 1–40)
BASOPHILS # BLD AUTO: 0.08 10*3/MM3 (ref 0–0.2)
BASOPHILS NFR BLD AUTO: 1.1 % (ref 0–1.5)
BILIRUB SERPL-MCNC: 0.4 MG/DL (ref 0–1.2)
BUN SERPL-MCNC: 10 MG/DL (ref 6–20)
BUN/CREAT SERPL: 9.1 (ref 7–25)
CALCIUM SPEC-SCNC: 9.3 MG/DL (ref 8.6–10.5)
CHLORIDE SERPL-SCNC: 104 MMOL/L (ref 98–107)
CO2 SERPL-SCNC: 26 MMOL/L (ref 22–29)
CREAT SERPL-MCNC: 1.1 MG/DL (ref 0.76–1.27)
DEPRECATED RDW RBC AUTO: 41.6 FL (ref 37–54)
EOSINOPHIL # BLD AUTO: 0.21 10*3/MM3 (ref 0–0.4)
EOSINOPHIL NFR BLD AUTO: 3 % (ref 0.3–6.2)
ERYTHROCYTE [DISTWIDTH] IN BLOOD BY AUTOMATED COUNT: 12.8 % (ref 12.3–15.4)
GFR SERPL CREATININE-BSD FRML MDRD: 71 ML/MIN/1.73
GLOBULIN UR ELPH-MCNC: 3 GM/DL
GLUCOSE SERPL-MCNC: 106 MG/DL (ref 65–99)
HCT VFR BLD AUTO: 40.7 % (ref 37.5–51)
HGB BLD-MCNC: 14.2 G/DL (ref 13–17.7)
IMM GRANULOCYTES # BLD AUTO: 0.01 10*3/MM3 (ref 0–0.05)
IMM GRANULOCYTES NFR BLD AUTO: 0.1 % (ref 0–0.5)
LYMPHOCYTES # BLD AUTO: 0.48 10*3/MM3 (ref 0.7–3.1)
LYMPHOCYTES NFR BLD AUTO: 6.9 % (ref 19.6–45.3)
MCH RBC QN AUTO: 31.4 PG (ref 26.6–33)
MCHC RBC AUTO-ENTMCNC: 34.9 G/DL (ref 31.5–35.7)
MCV RBC AUTO: 90 FL (ref 79–97)
MONOCYTES # BLD AUTO: 0.72 10*3/MM3 (ref 0.1–0.9)
MONOCYTES NFR BLD AUTO: 10.3 % (ref 5–12)
NEUTROPHILS NFR BLD AUTO: 5.46 10*3/MM3 (ref 1.7–7)
NEUTROPHILS NFR BLD AUTO: 78.6 % (ref 42.7–76)
NRBC BLD AUTO-RTO: 0 /100 WBC (ref 0–0.2)
PLATELET # BLD AUTO: 260 10*3/MM3 (ref 140–450)
PMV BLD AUTO: 10.8 FL (ref 6–12)
POTASSIUM SERPL-SCNC: 3.9 MMOL/L (ref 3.5–5.2)
PROT SERPL-MCNC: 7.2 G/DL (ref 6–8.5)
RBC # BLD AUTO: 4.52 10*6/MM3 (ref 4.14–5.8)
SODIUM SERPL-SCNC: 141 MMOL/L (ref 136–145)
WBC # BLD AUTO: 6.96 10*3/MM3 (ref 3.4–10.8)

## 2020-11-26 PROCEDURE — 80053 COMPREHEN METABOLIC PANEL: CPT | Performed by: FAMILY MEDICINE

## 2020-11-26 PROCEDURE — 85025 COMPLETE CBC W/AUTO DIFF WBC: CPT | Performed by: FAMILY MEDICINE

## 2020-12-03 ENCOUNTER — APPOINTMENT (OUTPATIENT)
Dept: GENERAL RADIOLOGY | Facility: HOSPITAL | Age: 50
End: 2020-12-03

## 2020-12-03 ENCOUNTER — HOSPITAL ENCOUNTER (EMERGENCY)
Facility: HOSPITAL | Age: 50
Discharge: NURSING, REHAB OR LONG-TERM CARE | End: 2020-12-03
Attending: FAMILY MEDICINE | Admitting: FAMILY MEDICINE

## 2020-12-03 ENCOUNTER — APPOINTMENT (OUTPATIENT)
Dept: CT IMAGING | Facility: HOSPITAL | Age: 50
End: 2020-12-03

## 2020-12-03 VITALS
OXYGEN SATURATION: 94 % | HEART RATE: 68 BPM | WEIGHT: 304.7 LBS | BODY MASS INDEX: 41.27 KG/M2 | DIASTOLIC BLOOD PRESSURE: 102 MMHG | HEIGHT: 72 IN | SYSTOLIC BLOOD PRESSURE: 159 MMHG | RESPIRATION RATE: 14 BRPM | TEMPERATURE: 98.5 F

## 2020-12-03 DIAGNOSIS — R41.0 CONFUSION: Primary | ICD-10-CM

## 2020-12-03 LAB
ALBUMIN SERPL-MCNC: 4.2 G/DL (ref 3.5–5.2)
ALBUMIN/GLOB SERPL: 1.3 G/DL
ALP SERPL-CCNC: 90 U/L (ref 39–117)
ALT SERPL W P-5'-P-CCNC: 44 U/L (ref 1–41)
ANION GAP SERPL CALCULATED.3IONS-SCNC: 14 MMOL/L (ref 5–15)
AST SERPL-CCNC: 28 U/L (ref 1–40)
BASOPHILS # BLD AUTO: 0.06 10*3/MM3 (ref 0–0.2)
BASOPHILS NFR BLD AUTO: 1.1 % (ref 0–1.5)
BILIRUB SERPL-MCNC: 0.3 MG/DL (ref 0–1.2)
BUN SERPL-MCNC: 13 MG/DL (ref 6–20)
BUN/CREAT SERPL: 10.8 (ref 7–25)
CALCIUM SPEC-SCNC: 9 MG/DL (ref 8.6–10.5)
CHLORIDE SERPL-SCNC: 102 MMOL/L (ref 98–107)
CK SERPL-CCNC: 72 U/L (ref 20–200)
CO2 SERPL-SCNC: 24 MMOL/L (ref 22–29)
CREAT SERPL-MCNC: 1.2 MG/DL (ref 0.76–1.27)
D-LACTATE SERPL-SCNC: 1 MMOL/L (ref 0.5–2)
D-LACTATE SERPL-SCNC: 5.4 MMOL/L (ref 0.5–2)
DEPRECATED RDW RBC AUTO: 42.5 FL (ref 37–54)
EOSINOPHIL # BLD AUTO: 0.24 10*3/MM3 (ref 0–0.4)
EOSINOPHIL NFR BLD AUTO: 4.4 % (ref 0.3–6.2)
ERYTHROCYTE [DISTWIDTH] IN BLOOD BY AUTOMATED COUNT: 12.8 % (ref 12.3–15.4)
GFR SERPL CREATININE-BSD FRML MDRD: 65 ML/MIN/1.73
GLOBULIN UR ELPH-MCNC: 3.2 GM/DL
GLUCOSE SERPL-MCNC: 134 MG/DL (ref 65–99)
HCT VFR BLD AUTO: 42.8 % (ref 37.5–51)
HGB BLD-MCNC: 14.6 G/DL (ref 13–17.7)
HOLD SPECIMEN: NORMAL
HOLD SPECIMEN: NORMAL
IMM GRANULOCYTES # BLD AUTO: 0.01 10*3/MM3 (ref 0–0.05)
IMM GRANULOCYTES NFR BLD AUTO: 0.2 % (ref 0–0.5)
LACTATE HOLD SPECIMEN: NORMAL
LYMPHOCYTES # BLD AUTO: 2.2 10*3/MM3 (ref 0.7–3.1)
LYMPHOCYTES NFR BLD AUTO: 40.4 % (ref 19.6–45.3)
MAGNESIUM SERPL-MCNC: 2.1 MG/DL (ref 1.6–2.6)
MCH RBC QN AUTO: 31.4 PG (ref 26.6–33)
MCHC RBC AUTO-ENTMCNC: 34.1 G/DL (ref 31.5–35.7)
MCV RBC AUTO: 92 FL (ref 79–97)
MONOCYTES # BLD AUTO: 0.4 10*3/MM3 (ref 0.1–0.9)
MONOCYTES NFR BLD AUTO: 7.4 % (ref 5–12)
NEUTROPHILS NFR BLD AUTO: 2.53 10*3/MM3 (ref 1.7–7)
NEUTROPHILS NFR BLD AUTO: 46.5 % (ref 42.7–76)
NRBC BLD AUTO-RTO: 0 /100 WBC (ref 0–0.2)
PLATELET # BLD AUTO: 267 10*3/MM3 (ref 140–450)
PMV BLD AUTO: 10.2 FL (ref 6–12)
POTASSIUM SERPL-SCNC: 3.7 MMOL/L (ref 3.5–5.2)
PROCALCITONIN SERPL-MCNC: 0.06 NG/ML (ref 0–0.25)
PROT SERPL-MCNC: 7.4 G/DL (ref 6–8.5)
QT INTERVAL: 404 MS
QTC INTERVAL: 494 MS
RBC # BLD AUTO: 4.65 10*6/MM3 (ref 4.14–5.8)
SODIUM SERPL-SCNC: 140 MMOL/L (ref 136–145)
TROPONIN T SERPL-MCNC: <0.01 NG/ML (ref 0–0.03)
WBC # BLD AUTO: 5.44 10*3/MM3 (ref 3.4–10.8)
WHOLE BLOOD HOLD SPECIMEN: NORMAL
WHOLE BLOOD HOLD SPECIMEN: NORMAL

## 2020-12-03 PROCEDURE — 80053 COMPREHEN METABOLIC PANEL: CPT | Performed by: FAMILY MEDICINE

## 2020-12-03 PROCEDURE — 85025 COMPLETE CBC W/AUTO DIFF WBC: CPT

## 2020-12-03 PROCEDURE — 93005 ELECTROCARDIOGRAM TRACING: CPT | Performed by: FAMILY MEDICINE

## 2020-12-03 PROCEDURE — 84145 PROCALCITONIN (PCT): CPT | Performed by: FAMILY MEDICINE

## 2020-12-03 PROCEDURE — 83605 ASSAY OF LACTIC ACID: CPT | Performed by: FAMILY MEDICINE

## 2020-12-03 PROCEDURE — 99284 EMERGENCY DEPT VISIT MOD MDM: CPT

## 2020-12-03 PROCEDURE — 71045 X-RAY EXAM CHEST 1 VIEW: CPT

## 2020-12-03 PROCEDURE — 82550 ASSAY OF CK (CPK): CPT | Performed by: FAMILY MEDICINE

## 2020-12-03 PROCEDURE — 93010 ELECTROCARDIOGRAM REPORT: CPT | Performed by: INTERNAL MEDICINE

## 2020-12-03 PROCEDURE — 96360 HYDRATION IV INFUSION INIT: CPT

## 2020-12-03 PROCEDURE — 84484 ASSAY OF TROPONIN QUANT: CPT | Performed by: FAMILY MEDICINE

## 2020-12-03 PROCEDURE — 70450 CT HEAD/BRAIN W/O DYE: CPT

## 2020-12-03 PROCEDURE — 36415 COLL VENOUS BLD VENIPUNCTURE: CPT | Performed by: FAMILY MEDICINE

## 2020-12-03 PROCEDURE — 83735 ASSAY OF MAGNESIUM: CPT | Performed by: FAMILY MEDICINE

## 2020-12-03 PROCEDURE — 87040 BLOOD CULTURE FOR BACTERIA: CPT | Performed by: FAMILY MEDICINE

## 2020-12-03 PROCEDURE — 93005 ELECTROCARDIOGRAM TRACING: CPT

## 2020-12-03 RX ORDER — SODIUM CHLORIDE 9 MG/ML
INJECTION, SOLUTION INTRAVENOUS
Status: DISCONTINUED
Start: 2020-12-03 | End: 2020-12-03 | Stop reason: HOSPADM

## 2020-12-03 RX ADMIN — SODIUM CHLORIDE 2328 ML: 9 INJECTION, SOLUTION INTRAVENOUS at 10:44

## 2020-12-03 NOTE — ED PROVIDER NOTES
"Subjective   Patient presents emergency department from the nursing home where he resides.  Nursing home staff states that patient was unarousable this morning and had \"snoring respirations.\"  Patient does have a history of a stroke, which is why he resides in a nursing home.  In the emergency department patient is awake alert and oriented and has no complaints.      Altered Mental Status  Presenting symptoms: confusion    Severity:  Moderate  Episode history:  Single  Progression:  Resolved  Associated symptoms: no abdominal pain, no fever, no headaches, no light-headedness, no nausea, no rash, no seizures, no vomiting and no weakness        Review of Systems   Constitutional: Positive for activity change. Negative for appetite change, chills, diaphoresis, fatigue and fever.   HENT: Negative for congestion, ear discharge, ear pain, nosebleeds, rhinorrhea, sinus pressure, sore throat and trouble swallowing.    Eyes: Negative for discharge and redness.   Respiratory: Negative for apnea, cough, chest tightness, shortness of breath and wheezing.    Cardiovascular: Negative for chest pain.   Gastrointestinal: Negative for abdominal pain, diarrhea, nausea and vomiting.   Endocrine: Negative for polyuria.   Genitourinary: Negative for dysuria, frequency and urgency.   Musculoskeletal: Negative for myalgias and neck pain.   Skin: Negative for color change and rash.   Allergic/Immunologic: Negative for immunocompromised state.   Neurological: Negative for dizziness, seizures, syncope, weakness, light-headedness and headaches.   Hematological: Negative for adenopathy. Does not bruise/bleed easily.   Psychiatric/Behavioral: Positive for confusion. Negative for behavioral problems.   All other systems reviewed and are negative.      Past Medical History:   Diagnosis Date   • Anxiety    • Depression    • GERD (gastroesophageal reflux disease)    • High blood pressure    • Sleep apnea    • Stroke (CMS/[facility]) 09/2018    L side " paralysis       Allergies   Allergen Reactions   • Morphine Hallucinations       Past Surgical History:   Procedure Laterality Date   • BACK SURGERY     • CARDIAC SURGERY  09/2019    loop recorder   • CARPAL TUNNEL RELEASE     • COLONOSCOPY N/A 2/25/2020    Procedure: COLONOSCOPY;  Surgeon: Yaron Martínez MD;  Location: [facility] ENDOSCOPY;  Service: Gastroenterology;  Laterality: N/A;   • ENDOSCOPY N/A 2/25/2020    Procedure: ESOPHAGOGASTRODUODENOSCOPY;  Surgeon: Yaron Martínez MD;  Location: [facility] ENDOSCOPY;  Service: Gastroenterology;  Laterality: N/A;   • NECK SURGERY         Family History   Problem Relation Age of Onset   • Hypertension Mother    • Hypertension Father    • Cancer Paternal Grandfather    • Heart defect Paternal Grandfather        Social History     Socioeconomic History   • Marital status:      Spouse name: Not on file   • Number of children: Not on file   • Years of education: Not on file   • Highest education level: Not on file   Tobacco Use   • Smoking status: Light Tobacco Smoker   • Smokeless tobacco: Never Used   Substance and Sexual Activity   • Alcohol use: No   • Drug use: No   • Sexual activity: Defer           Objective   Physical Exam  Vitals signs and nursing note reviewed.   Constitutional:       Appearance: He is well-developed.   HENT:      Head: Normocephalic and atraumatic.      Nose: Nose normal.   Eyes:      General: No scleral icterus.        Right eye: No discharge.         Left eye: No discharge.      Conjunctiva/sclera: Conjunctivae normal.      Pupils: Pupils are equal, round, and reactive to light.   Neck:      Musculoskeletal: Normal range of motion and neck supple.      Trachea: No tracheal deviation.   Cardiovascular:      Rate and Rhythm: Normal rate and regular rhythm.      Heart sounds: Normal heart sounds. No murmur.   Pulmonary:      Effort: Pulmonary effort is normal. No respiratory distress.      Breath sounds: Normal breath sounds. No stridor. No  wheezing or rales.   Abdominal:      General: Bowel sounds are normal. There is no distension.      Palpations: Abdomen is soft. There is no mass.      Tenderness: There is no abdominal tenderness. There is no guarding or rebound.   Skin:     General: Skin is warm and dry.      Findings: No erythema or rash.   Neurological:      Mental Status: He is alert and oriented to person, place, and time.      Coordination: Coordination normal.   Psychiatric:         Behavior: Behavior normal.         Thought Content: Thought content normal.         ECG 12 Lead      Date/Time: 12/3/2020 3:04 PM  Performed by: Elio Carlton MD  Authorized by: Elio Carlton MD   Interpreted by physician  Rhythm: sinus rhythm  Rate: normal  BPM: 90  ST Segments: ST segments normal                   ED Course                  Labs Reviewed   COMPREHENSIVE METABOLIC PANEL - Abnormal; Notable for the following components:       Result Value    Glucose 134 (*)     ALT (SGPT) 44 (*)     All other components within normal limits    Narrative:     GFR Normal >60  Chronic Kidney Disease <60  Kidney Failure <15     LACTIC ACID, PLASMA - Abnormal; Notable for the following components:    Lactate 5.4 (*)     All other components within normal limits   CBC WITH AUTO DIFFERENTIAL - Normal   CK - Normal   MAGNESIUM - Normal   TROPONIN (IN-HOUSE) - Normal    Narrative:     Troponin T Reference Range:  <= 0.03 ng/mL-   Negative for AMI  >0.03 ng/mL-     Abnormal for myocardial necrosis.  Clinicians would have to utilize clinical acumen, EKG, Troponin and serial changes to determine if it is an Acute Myocardial Infarction or myocardial injury due to an underlying chronic condition.       Results may be falsely decreased if patient taking Biotin.     PROCALCITONIN - Normal    Narrative:     As a Marker for Sepsis (Non-Neonates):   1. <0.5 ng/mL represents a low risk of severe sepsis and/or septic shock.  1. >2 ng/mL represents a high risk of  "severe sepsis and/or septic shock.    As a Marker for Lower Respiratory Tract Infections that require antibiotic therapy:  PCT on Admission     Antibiotic Therapy             6-12 Hrs later  > 0.5                Strongly Recommended            >0.25 - <0.5         Recommended  0.1 - 0.25           Discouraged                   Remeasure/reassess PCT  <0.1                 Strongly Discouraged          Remeasure/reassess PCT      As 28 day mortality risk marker: \"Change in Procalcitonin Result\" (> 80 % or <=80 %) if Day 0 (or Day 1) and Day 4 values are available. Refer to http://www.[employer] [place]-pct-calculator.com/   Change in PCT <=80 %   A decrease of PCT levels below or equal to 80 % defines a positive change in PCT test result representing a higher risk for 28-day all-cause mortality of patients diagnosed with severe sepsis or septic shock.  Change in PCT > 80 %   A decrease of PCT levels of more than 80 % defines a negative change in PCT result representing a lower risk for 28-day all-cause mortality of patients diagnosed with severe sepsis or septic shock.                Results may be falsely decreased if patient taking Biotin.    LACTIC ACID, REFLEX - Normal   BLOOD CULTURE   BLOOD CULTURE   COVID PRE-OP / PRE-PROCEDURE SCREENING ORDER (NO ISOLATION)   RAINBOW DRAW    Narrative:     The following orders were created for panel order [place] Draw.  Procedure                               Abnormality         Status                     ---------                               -----------         ------                     Light Blue Top[[id number]]                                   Final result               Green Top (Gel)[[id number]]                                  Final result               Lavender Top[[id number]]                                     Final result               Gold Top - SST[[id number]]                                   Final result                 Please view results for these tests on the individual " orders.   LACTIC ACID REFLEX TIMER   URINALYSIS W/ CULTURE IF INDICATED   URINE DRUG SCREEN   CBC AND DIFFERENTIAL    Narrative:     The following orders were created for panel order CBC & Differential.  Procedure                               Abnormality         Status                     ---------                               -----------         ------                     CBC Auto Differential[[id number]]        Normal              Final result                 Please view results for these tests on the individual orders.   LIGHT BLUE TOP   GREEN TOP   LAVENDER TOP   GOLD TOP - SST       CT Head Without Contrast   Final Result   Sequela of a large old right MCA distribution infarct with   extensive encephalomalacia. Smaller areas of prior infarct   involving the frontal lobes and the bilateral basal ganglia.   There is associated ex vacuo effect of the ventricles and   Wallerian degeneration involving the brainstem.      No evidence of intracranial hemorrhage or mass effect.      Comment: If signs and symptoms persist and/or progresses consider   MRI imaging in follow-up.      Electronically signed by:  Maciel Jensen MD  12/3/2020 11:44 AM   CST Workstation: [id number]      XR Chest 1 View   Final Result   Increased bronchovascular markings at both bases   accentuated by poor respiratory effort. Otherwise unremarkable   chest.      Electronically signed by:  Dennis Beal MD  12/3/2020 10:12 AM CST   Workstation: [id number]                                     MDM    Final diagnoses:   Confusion            Elio Carlton MD  12/03/20 0010

## 2020-12-03 NOTE — ED NOTES
Wife and nursing home notified that patient was coming back to nursing home.      Neyda So RN  12/03/20 1640

## 2020-12-03 NOTE — ED NOTES
Cancelled ambulance transport, [facility] is coming with his wheelchair and a handicap van to get this patient.

## 2020-12-08 LAB
BACTERIA SPEC AEROBE CULT: NORMAL
BACTERIA SPEC AEROBE CULT: NORMAL

## 2021-02-25 ENCOUNTER — OFFICE VISIT (OUTPATIENT)
Dept: SLEEP MEDICINE | Facility: HOSPITAL | Age: 51
End: 2021-02-25

## 2021-02-25 VITALS
SYSTOLIC BLOOD PRESSURE: 136 MMHG | WEIGHT: 302 LBS | HEIGHT: 72 IN | OXYGEN SATURATION: 95 % | BODY MASS INDEX: 40.9 KG/M2 | HEART RATE: 74 BPM | DIASTOLIC BLOOD PRESSURE: 68 MMHG

## 2021-02-25 DIAGNOSIS — F51.04 PSYCHOPHYSIOLOGICAL INSOMNIA: ICD-10-CM

## 2021-02-25 DIAGNOSIS — I69.354 FLACCID HEMIPLEGIA OF LEFT NONDOMINANT SIDE AS LATE EFFECT OF CEREBRAL INFARCTION (HCC): ICD-10-CM

## 2021-02-25 DIAGNOSIS — G47.33 OBSTRUCTIVE SLEEP APNEA, ADULT: Primary | ICD-10-CM

## 2021-02-25 DIAGNOSIS — E66.01 MORBIDLY OBESE (HCC): ICD-10-CM

## 2021-02-25 DIAGNOSIS — G47.19 EXCESSIVE DAYTIME SLEEPINESS: ICD-10-CM

## 2021-02-25 PROCEDURE — 99215 OFFICE O/P EST HI 40 MIN: CPT | Performed by: NURSE PRACTITIONER

## 2021-02-25 RX ORDER — FLUOXETINE HYDROCHLORIDE 40 MG/1
50 CAPSULE ORAL DAILY
COMMUNITY

## 2021-02-25 RX ORDER — ATORVASTATIN CALCIUM 80 MG/1
TABLET, FILM COATED ORAL
COMMUNITY
Start: 2021-02-15

## 2021-02-25 RX ORDER — LISINOPRIL 40 MG/1
TABLET ORAL
COMMUNITY
Start: 2021-02-11

## 2021-02-25 RX ORDER — ERGOCALCIFEROL 1.25 MG/1
50000 CAPSULE ORAL WEEKLY
COMMUNITY

## 2021-02-25 RX ORDER — POTASSIUM CHLORIDE 750 MG/1
TABLET, FILM COATED, EXTENDED RELEASE ORAL
COMMUNITY
Start: 2021-01-31

## 2021-02-25 RX ORDER — BUSPIRONE HYDROCHLORIDE 5 MG/1
TABLET ORAL
COMMUNITY
Start: 2021-02-01

## 2021-02-25 RX ORDER — LACTULOSE 10 G/15ML
SOLUTION ORAL
COMMUNITY
Start: 2020-11-23 | End: 2021-02-25

## 2021-02-25 RX ORDER — FLUOXETINE 20 MG/1
TABLET, FILM COATED ORAL
COMMUNITY
Start: 2021-02-17 | End: 2021-02-25

## 2021-02-25 NOTE — PROGRESS NOTES
New Patient Sleep Medicine Consultation    Encounter Date: 2/25/2021         Patient's Primary Care Provider: Drea Armenta MD  Referring Provider: No ref. provider found  Reason for consultation/chief complaint: snoring, unrefreshing sleep and insomnia - history of STEVE on BiPAP    Joel Patton is a 50 y.o. male who admits to snoring, unrestful sleep, High blod pressure, morning headaches, sleeping less than 6 hours per night, Disturbed or restless sleep, difficulty falling asleep, difficulty staying asleep and takes medicine to help go to sleep.     He denies cataplexy, sleep paralysis, or hypnagogic hallucinations. His bedtime is ~ 2100. He  falls asleep after 60 minutes, and is up 3-5 times per night. He wakes up ~ 0700, sometimes back to sleep. He endorses 2-4 hours of sleep. He drinks 0 cups of coffee, 0 teas, and 3-4 sodas per day. He drinks 0 alcoholic beverages per week. He is not a current smoker. He does not take sedatives or hypnotics. He does take melatonin 3 mg QHS. He has no sleepiness with driving - does not drive. He naps every couple of days, more lately since his BiPAP is not working. He states that he also has excessive daytime sleepiness and he takes modafinil 200 mg daily.    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 He is a resident of skilled nursing facility ([facility]). Our staff called and discussed patient's BiPAP issues with a unit manager at [facility] - she states the patient had a BiPAP which was provided by the [facility]. Mr. Patton started complaining of decreased pressure and not getting enough air within the past couple of months. At that point, he was given a different machine and is still making the same complaints. He states that he has been taking his mask off and laying it in the bed because it is not doing him any good and it is drying his mouth out.  He has not had a pressure  adjustment to his knowledge.      [place] - 13    Prior Sleep Testin. PSG on 2010, AHI of 15.5, O2 yin of 85% with prolonged hypoxia  2. CPAP titration on 2010, recommended 11 cm H2O   3. Currently on BiPAP 14/5 cm H2O with 2L O2    DME: Through [facility]  Mask: Full face mask    Past Medical History:   Diagnosis Date   • Anxiety    • Depression    • GERD (gastroesophageal reflux disease)    • High blood pressure    • Sleep apnea    • Stroke (CMS/[facility]) 2018    L side paralysis     Social History     Socioeconomic History   • Marital status:      Spouse name: Not on file   • Number of children: Not on file   • Years of education: Not on file   • Highest education level: Not on file   Tobacco Use   • Smoking status: Light Tobacco Smoker   • Smokeless tobacco: Never Used   Substance and Sexual Activity   • Alcohol use: No   • Drug use: No   • Sexual activity: Defer     Family History   Problem Relation Age of Onset   • Hypertension Mother    • Hypertension Father    • Cancer Paternal Grandfather    • Heart defect Paternal Grandfather      Prior T&A, UPPP, maxillofacial, or bariatric surgery: None  Family history of sleep disorders: Mother - STEVE on CPAP  Other family history + for: As above  Occupation: Disabled  Marital status:   Children: 3  Has 2 brothers and 0 sisters  Smoking history: smoked 1 ppds from age 19 until 48      Review of Systems:  Constitutional: positive for fatigue  Eyes: positive for visual disturbance  Ears, nose, mouth, throat, and face: positive for snoring  Respiratory: negative  Cardiovascular: negative  Gastrointestinal: negative  Genitourinary:negative  Integument/breast: negative  Hematologic/lymphatic: negative  Musculoskeletal:positive for muscle weakness, stiff joints and left sided paralysis  Neurological: negative  Behavioral/Psych: negative  Endocrine: negative  Allergic/Immunologic: negative   Patient advised to discuss any  "positive ROS with PCP.      Vitals:    02/25/21 1546   BP: 136/68   Pulse: 74   SpO2: 95%           02/25/21  1546   Weight: (!) 137 kg (302 lb)       Body mass index is 40.95 kg/m². Patient's Body mass index is 40.95 kg/m². BMI is above normal parameters. Recommendations include: referral to primary care.      Physical Exam:        General: Alert. Cooperative. Well developed. No acute distress.   Head/Neck:  Normocephalic. Symmetrical. Atraumatic.     Neck circumference: 20\"             Eyes: Sclera clear. No icterus. PERRLA. Normal EOM.             Ears: No deformities. Normal hearing.             Nose: No septal deviation. No drainage.          Throat: No oral lesions. No thrush. Moist mucous membranes. Trachea midline    Tongue is enlarged     Dentition is poor       Pharynx: Posterior pharyngeal pillars are narrow    Mallampati score of IV (only hard palate visible)    Pharynx is normal with unrermarkable tonsils   Chest Wall:  Normal shape. Symmetric expansion with respiration. No tenderness.          Lungs:  Clear to auscultation bilaterally. No wheezes. No rhonchi. No rales. Respirations regular, even and unlabored.            Heart:  Regular rhythm and normal rate. Normal S1 and S2. No murmur.     Abdomen:  Soft, non-tender and non-distended. Normal bowel sounds. No masses.  Extremities:  Moves all extremities well. No edema.           Pulses: Pulses palpable and equal bilaterally.               Skin: Dry. Intact. No bleeding. No rash.           Neuro: Moves all 4 extremities and cranial nerves grossly intact.  Psychiatric: Normal mood and affect.      Current Outpatient Medications:   •  acetaminophen (TYLENOL) 650 MG 8 hr tablet, Take 650 mg by mouth., Disp: , Rfl:   •  busPIRone (BUSPAR) 5 MG tablet, , Disp: , Rfl:   •  diclofenac (VOLTAREN) 1 % gel gel, Apply 2 g topically to the appropriate area as directed., Disp: , Rfl:   •  FLUoxetine (PROzac) 40 MG capsule, Take 50 mg by mouth Daily., Disp: , Rfl:  "   •  gabapentin (NEURONTIN) 300 MG capsule, Take 300 mg by mouth., Disp: , Rfl:   •  linaclotide (LINZESS) 290 MCG capsule capsule, Take 290 mcg by mouth Every Morning Before Breakfast., Disp: , Rfl:   •  lisinopril (PRINIVIL,ZESTRIL) 40 MG tablet, , Disp: , Rfl:   •  melatonin 3 MG tablet, Take 15 mg by mouth At Night As Needed., Disp: , Rfl:   •  modafinil (PROVIGIL) 100 MG tablet, Take 200 mg by mouth Daily., Disp: , Rfl:   •  pantoprazole (PROTONIX) 40 MG EC tablet, Take 40 mg by mouth., Disp: , Rfl:   •  phenazopyridine (PYRIDIUM) 100 MG tablet, Take 100 mg by mouth 3 (Three) Times a Day As Needed for Bladder Spasms., Disp: , Rfl:   •  potassium chloride 10 MEQ CR tablet, , Disp: , Rfl:   •  senna (SENOKOT) 8.6 MG tablet tablet, Take  by mouth Every Night., Disp: , Rfl:   •  vitamin D (ERGOCALCIFEROL) 1.25 MG (37485 UT) capsule capsule, Take 50,000 Units by mouth 1 (One) Time Per Week., Disp: , Rfl:   •  amLODIPine (NORVASC) 10 MG tablet, Take 10 mg by mouth., Disp: , Rfl:   •  atorvastatin (LIPITOR) 80 MG tablet, , Disp: , Rfl:   •  lubiprostone (AMITIZA) 8 MCG capsule, Take 1 capsule by mouth 2 (Two) Times a Day With Meals., Disp: 60 capsule, Rfl: 5    WBC   Date Value Ref Range Status   12/03/2020 5.44 3.40 - 10.80 10*3/mm3 Final   09/13/2019 12 (H) 4.0 - 11.0 10*3/uL Final     RBC   Date Value Ref Range Status   12/03/2020 4.65 4.14 - 5.80 10*6/mm3 Final   09/13/2019 5.09 4.73 - 5.49 10*6/uL Final     Hemoglobin   Date Value Ref Range Status   12/03/2020 14.6 13.0 - 17.7 g/dL Final   09/13/2019 15.8 14.4 - 16.6 g/dL Final     Hematocrit   Date Value Ref Range Status   12/03/2020 42.8 37.5 - 51.0 % Final   09/13/2019 46.8 42.9 - 49.1 % Final     MCV   Date Value Ref Range Status   12/03/2020 92.0 79.0 - 97.0 fL Final   09/13/2019 92 85 - 95 fL Final     MCH   Date Value Ref Range Status   12/03/2020 31.4 26.6 - 33.0 pg Final   09/13/2019 31 28.0 - 32.0 pg Final     MCHC   Date Value Ref Range Status    12/03/2020 34.1 31.5 - 35.7 g/dL Final   09/13/2019 33.8 32.0 - 34.0 g/dL Final     RDW   Date Value Ref Range Status   12/03/2020 12.8 12.3 - 15.4 % Final   09/13/2019 46.3 37 - 54 fL Final     RDW-SD   Date Value Ref Range Status   12/03/2020 42.5 37.0 - 54.0 fl Final     MPV   Date Value Ref Range Status   12/03/2020 10.2 6.0 - 12.0 fL Final   09/13/2019 11 8.0 - 12.0 fL Final     Platelets   Date Value Ref Range Status   12/03/2020 267 140 - 450 10*3/mm3 Final   09/13/2019 312 150 - 450 10*3/uL Final     Neutrophil %   Date Value Ref Range Status   12/03/2020 46.5 42.7 - 76.0 % Final     Lymphocyte Rel %   Date Value Ref Range Status   09/13/2019 31.1 5 - 55 % Final     Lymphocyte %   Date Value Ref Range Status   12/03/2020 40.4 19.6 - 45.3 % Final     Monocyte Rel %   Date Value Ref Range Status   09/13/2019 7.2 3 - 8 % Final     Monocyte %   Date Value Ref Range Status   12/03/2020 7.4 5.0 - 12.0 % Final     Eosinophil Rel %   Date Value Ref Range Status   09/13/2019 6.2 (H) 0 - 5 % Final     Eosinophil %   Date Value Ref Range Status   12/03/2020 4.4 0.3 - 6.2 % Final     Basophil Rel %   Date Value Ref Range Status   09/13/2019 1 0 - 2 % Final     Basophil %   Date Value Ref Range Status   12/03/2020 1.1 0.0 - 1.5 % Final     Immature Grans %   Date Value Ref Range Status   12/03/2020 0.2 0.0 - 0.5 % Final   09/13/2019 54.1 45 - 80 % Final     Neutrophils, Absolute   Date Value Ref Range Status   12/03/2020 2.53 1.70 - 7.00 10*3/mm3 Final     Lymphocytes, Absolute   Date Value Ref Range Status   12/03/2020 2.20 0.70 - 3.10 10*3/mm3 Final     Monocytes, Absolute   Date Value Ref Range Status   12/03/2020 0.40 0.10 - 0.90 10*3/mm3 Final     Eosinophils, Absolute   Date Value Ref Range Status   12/03/2020 0.24 0.00 - 0.40 10*3/mm3 Final     Basophils, Absolute   Date Value Ref Range Status   12/03/2020 0.06 0.00 - 0.20 10*3/mm3 Final     Immature Grans, Absolute   Date Value Ref Range Status   12/03/2020  0.01 0.00 - 0.05 10*3/mm3 Final     nRBC   Date Value Ref Range Status   12/03/2020 0.0 0.0 - 0.2 /100 WBC Final     Lab Results   Component Value Date    GLUCOSE 134 (H) 12/03/2020    BUN 13 12/03/2020    CREATININE 1.20 12/03/2020    EGFRIFNONA 65 12/03/2020    BCR 10.8 12/03/2020    K 3.7 12/03/2020    CO2 24.0 12/03/2020    CALCIUM 9.0 12/03/2020    ALBUMIN 4.20 12/03/2020    AST 28 12/03/2020    ALT 44 (H) 12/03/2020       Contraindications to home sleep test: Suspicion of central sleep apnea, History of cerebrovascular accident and/or TIA and Ongoing narcotic therapy especially with infusion pumps    ASSESSMENT:  1. Obstructive sleep apnea - New (to me), additional work-up planned (4)  1. Reportedly compliant with BiPAP therapy at [facility] until recently   2. Recommend pressure adjustment to 18/11 cm H2O with 2L O2 bled into PAP  3. Follow up in 3 weeks  4. Consider in lab titration study if still having issues with pressure intolerance  2. Nocturnal hypoxia - New (to me), no additional work-up planned (3)  1. Continue supplemental O2 bled into BiPAP  3. Insomnia - New (to me), no additional work-up planned (3)  1. Continue melatonin QHS - managed by PCP  4. Excessive daytime sleepiness - New (to me), additional work-up planned (4)    1.   Likely due to chronic comorbid conditions as well as uncontrolled STEVE   2.   BiPAP pressure adjustments and follow up in 3 weeks   3.   Continue modafinil 200 mg daily per PCP  5. History of CVA with residual left-sided paralysis - New (to me), no additional work-up planned (3)         6. Morbid obesity - BMI 40.9 - Stable chronic illness      I spent 45 minutes caring for Joel on this date of service. This time includes time spent by me in the following activities: preparing for the visit, reviewing tests, obtaining and/or reviewing a separately obtained history, performing a medically appropriate examination and/or evaluation , counseling and educating  the patient/family/caregiver, ordering medications, tests, or procedures, documenting information in the medical record and care coordination; discussing PAP therapy, PAP compliance and PAP maintenance; medication reconciliation     RTC in 1 month. Patient agrees to return sooner if changes in symptoms.           This document has been electronically signed by ANGEL Suarez on February 25, 2021 16:38 CST          CC: Drea Armenta MD          No ref. provider found

## 2021-03-18 ENCOUNTER — OFFICE VISIT (OUTPATIENT)
Dept: SLEEP MEDICINE | Facility: HOSPITAL | Age: 51
End: 2021-03-18

## 2021-03-18 VITALS
BODY MASS INDEX: 40.9 KG/M2 | HEART RATE: 76 BPM | OXYGEN SATURATION: 96 % | HEIGHT: 72 IN | DIASTOLIC BLOOD PRESSURE: 97 MMHG | SYSTOLIC BLOOD PRESSURE: 133 MMHG | WEIGHT: 302 LBS

## 2021-03-18 DIAGNOSIS — I69.354 FLACCID HEMIPLEGIA OF LEFT NONDOMINANT SIDE AS LATE EFFECT OF CEREBRAL INFARCTION (HCC): ICD-10-CM

## 2021-03-18 DIAGNOSIS — G47.34 NOCTURNAL HYPOXIA: ICD-10-CM

## 2021-03-18 DIAGNOSIS — F51.04 PSYCHOPHYSIOLOGICAL INSOMNIA: ICD-10-CM

## 2021-03-18 DIAGNOSIS — E66.01 MORBIDLY OBESE (HCC): ICD-10-CM

## 2021-03-18 DIAGNOSIS — G47.33 OBSTRUCTIVE SLEEP APNEA, ADULT: Primary | ICD-10-CM

## 2021-03-18 DIAGNOSIS — G47.19 EXCESSIVE DAYTIME SLEEPINESS: ICD-10-CM

## 2021-03-18 PROCEDURE — 99213 OFFICE O/P EST LOW 20 MIN: CPT | Performed by: NURSE PRACTITIONER

## 2021-03-18 RX ORDER — LACTULOSE 10 G/15ML
SOLUTION ORAL
COMMUNITY
Start: 2021-03-11

## 2021-03-18 NOTE — PROGRESS NOTES
Sleep Clinic Follow Up    Date: 3/18/2021  Primary Care Provider: Drea Armenta MD    Last office visit: 2021 (I reviewed this note)    CC: Follow up: STEVE on BiPAP (resident of [facility])      Interim History:  Since the last visit:    1) moderate STEVE -  Joel Patton has reportedly not remained compliant with BiPAP. He denies mask and machine issues, dry mouth, headaches, or pressures intolerance. He denies abnormal dreams, sleep paralysis, nasal congestion, URI sx.    2) Patient denies RLS symptoms.     Sleep Testin. PSG on 2010, AHI of 15.5, O2 yin of 85% with prolonged hypoxia  2. CPAP titration on 2010, recommended 11 cm H2O   3. Currently on BiPAP 18/11 cm H2O with 2L O2    PAP Data:    No data available  Mask type: Full face mask  DME: [facility]    Bed time routine unchanged from previous visit.     PMHx, FH, SH reviewed and pertinent changes are: Reportedly unchanged from last office visit with us.      REVIEW OF SYSTEMS:   Negative for chest pain, SOA, fever, chills, cough, N/V/D, abdominal pain.    Smoking:none - former smoker      Exam:  Vitals:    21 1348   BP: 133/97   Pulse: 76   SpO2: 96%           21  1348   Weight: (!) 137 kg (302 lb)     Body mass index is 40.95 kg/m². Patient's Body mass index is 40.95 kg/m². BMI is above normal parameters. Recommendations include: referral to primary care.      Gen:                No distress, conversant, pleasant, appears stated age, alert, oriented  Eyes:               Anicteric sclera, moist conjunctiva, no lid lag                           PERRL, EOMI   Heent:             NC/AT                          Oropharynx clear                          Normal hearing  Lungs:             Normal effort, non-labored breathing                          Clear to auscultation bilaterally          CV:                  Normal S1/S2, no murmur                           No lower extremity edema  ABD:               Soft, rounded, non-distended                          Normal bowel sounds                    Psych:             Appropriate affect  Neuro:             CN 2-12 appear intact    Past Medical History:   Diagnosis Date   • Anxiety    • Depression    • GERD (gastroesophageal reflux disease)    • High blood pressure    • Sleep apnea    • Stroke (CMS/HCC) 09/2018    L side paralysis       Current Outpatient Medications:   •  acetaminophen (TYLENOL) 650 MG 8 hr tablet, Take 650 mg by mouth., Disp: , Rfl:   •  amLODIPine (NORVASC) 10 MG tablet, Take 10 mg by mouth., Disp: , Rfl:   •  atorvastatin (LIPITOR) 80 MG tablet, , Disp: , Rfl:   •  busPIRone (BUSPAR) 5 MG tablet, , Disp: , Rfl:   •  diclofenac (VOLTAREN) 1 % gel gel, Apply 2 g topically to the appropriate area as directed., Disp: , Rfl:   •  FLUoxetine (PROzac) 40 MG capsule, Take 50 mg by mouth Daily., Disp: , Rfl:   •  gabapentin (NEURONTIN) 300 MG capsule, Take 300 mg by mouth., Disp: , Rfl:   •  lactulose (CHRONULAC) 10 GM/15ML solution, , Disp: , Rfl:   •  linaclotide (LINZESS) 290 MCG capsule capsule, Take 290 mcg by mouth Every Morning Before Breakfast., Disp: , Rfl:   •  lisinopril (PRINIVIL,ZESTRIL) 40 MG tablet, , Disp: , Rfl:   •  lubiprostone (AMITIZA) 8 MCG capsule, Take 1 capsule by mouth 2 (Two) Times a Day With Meals., Disp: 60 capsule, Rfl: 5  •  melatonin 3 MG tablet, Take 15 mg by mouth At Night As Needed., Disp: , Rfl:   •  modafinil (PROVIGIL) 100 MG tablet, Take 200 mg by mouth Daily., Disp: , Rfl:   •  pantoprazole (PROTONIX) 40 MG EC tablet, Take 40 mg by mouth., Disp: , Rfl:   •  phenazopyridine (PYRIDIUM) 100 MG tablet, Take 100 mg by mouth 3 (Three) Times a Day As Needed for Bladder Spasms., Disp: , Rfl:   •  potassium chloride 10 MEQ CR tablet, , Disp: , Rfl:   •  senna (SENOKOT) 8.6 MG tablet tablet, Take  by mouth Every Night., Disp: , Rfl:   •  vitamin D (ERGOCALCIFEROL) 1.25  MG (49511 UT) capsule capsule, Take 50,000 Units by mouth 1 (One) Time Per Week., Disp: , Rfl:     WBC   Date Value Ref Range Status   12/03/2020 5.44 3.40 - 10.80 10*3/mm3 Final   09/13/2019 12 (H) 4.0 - 11.0 10*3/uL Final     RBC   Date Value Ref Range Status   12/03/2020 4.65 4.14 - 5.80 10*6/mm3 Final   09/13/2019 5.09 4.73 - 5.49 10*6/uL Final     Hemoglobin   Date Value Ref Range Status   12/03/2020 14.6 13.0 - 17.7 g/dL Final   09/13/2019 15.8 14.4 - 16.6 g/dL Final     Hematocrit   Date Value Ref Range Status   12/03/2020 42.8 37.5 - 51.0 % Final   09/13/2019 46.8 42.9 - 49.1 % Final     MCV   Date Value Ref Range Status   12/03/2020 92.0 79.0 - 97.0 fL Final   09/13/2019 92 85 - 95 fL Final     MCH   Date Value Ref Range Status   12/03/2020 31.4 26.6 - 33.0 pg Final   09/13/2019 31 28.0 - 32.0 pg Final     MCHC   Date Value Ref Range Status   12/03/2020 34.1 31.5 - 35.7 g/dL Final   09/13/2019 33.8 32.0 - 34.0 g/dL Final     RDW   Date Value Ref Range Status   12/03/2020 12.8 12.3 - 15.4 % Final   09/13/2019 46.3 37 - 54 fL Final     RDW-SD   Date Value Ref Range Status   12/03/2020 42.5 37.0 - 54.0 fl Final     MPV   Date Value Ref Range Status   12/03/2020 10.2 6.0 - 12.0 fL Final   09/13/2019 11 8.0 - 12.0 fL Final     Platelets   Date Value Ref Range Status   12/03/2020 267 140 - 450 10*3/mm3 Final   09/13/2019 312 150 - 450 10*3/uL Final     Neutrophil %   Date Value Ref Range Status   12/03/2020 46.5 42.7 - 76.0 % Final     Lymphocyte Rel %   Date Value Ref Range Status   09/13/2019 31.1 5 - 55 % Final     Lymphocyte %   Date Value Ref Range Status   12/03/2020 40.4 19.6 - 45.3 % Final     Monocyte Rel %   Date Value Ref Range Status   09/13/2019 7.2 3 - 8 % Final     Monocyte %   Date Value Ref Range Status   12/03/2020 7.4 5.0 - 12.0 % Final     Eosinophil Rel %   Date Value Ref Range Status   09/13/2019 6.2 (H) 0 - 5 % Final     Eosinophil %   Date Value Ref Range Status   12/03/2020 4.4 0.3 - 6.2  % Final     Basophil Rel %   Date Value Ref Range Status   09/13/2019 1 0 - 2 % Final     Basophil %   Date Value Ref Range Status   12/03/2020 1.1 0.0 - 1.5 % Final     Immature Grans %   Date Value Ref Range Status   12/03/2020 0.2 0.0 - 0.5 % Final   09/13/2019 54.1 45 - 80 % Final     Neutrophils, Absolute   Date Value Ref Range Status   12/03/2020 2.53 1.70 - 7.00 10*3/mm3 Final     Lymphocytes, Absolute   Date Value Ref Range Status   12/03/2020 2.20 0.70 - 3.10 10*3/mm3 Final     Monocytes, Absolute   Date Value Ref Range Status   12/03/2020 0.40 0.10 - 0.90 10*3/mm3 Final     Eosinophils, Absolute   Date Value Ref Range Status   12/03/2020 0.24 0.00 - 0.40 10*3/mm3 Final     Basophils, Absolute   Date Value Ref Range Status   12/03/2020 0.06 0.00 - 0.20 10*3/mm3 Final     Immature Grans, Absolute   Date Value Ref Range Status   12/03/2020 0.01 0.00 - 0.05 10*3/mm3 Final     nRBC   Date Value Ref Range Status   12/03/2020 0.0 0.0 - 0.2 /100 WBC Final       Lab Results   Component Value Date    GLUCOSE 134 (H) 12/03/2020    BUN 13 12/03/2020    CREATININE 1.20 12/03/2020    EGFRIFNONA 65 12/03/2020    BCR 10.8 12/03/2020    K 3.7 12/03/2020    CO2 24.0 12/03/2020    CALCIUM 9.0 12/03/2020    ALBUMIN 4.20 12/03/2020    AST 28 12/03/2020    ALT 44 (H) 12/03/2020     Assessment and Plan:    1. Obstructive sleep apnea - Established, stable (1)  1. Non-compliant with PAP therapy  2. Continue PAP as prescribed for now  3. In lab titration study due to suspicion of possible central events of apnea given history of CVA and PAP tolerance difficulties  4. COVID screening 3 days prior  5. Return to clinic in 2 weeks after study unless change in symptoms in interim period  2. Nocturnal hypoxia - Established, stable (1)   1. Continue nocturnal O2 @ 2LPM  3. Insomnia - sleep onset and or maintenance - Established, stable (1)   1. Continue melatonin 3 mg QHS per PCP  4. Excessive daytime sleepiness - Established, stable (1)    1. Continue modafinil 200 mg daily per PCP  5. Left-sided paralysis due to history of CVA - Established, stable (1)  6. Morbid obesity - BMI 40.9 - stable chronic illness        I spent 25 minutes caring for Joel on this date of service. This time includes time spent by me in the following activities: preparing for the visit, obtaining and/or reviewing a separately obtained history, performing a medically appropriate examination and/or evaluation , counseling and educating the patient/family/caregiver, ordering medications, tests, or procedures and documenting information in the medical record; discussing PAP therapy, PAP compliance, PAP maintenance and Further testing    RTC 2 weeks after testing. Patient agrees to return sooner if changes in symptoms.        This document has been electronically signed by ANGEL Suarez on March 18, 2021 14:04 CDT          CC: Drea Armenta MD          No ref. provider found

## 2021-04-19 ENCOUNTER — LAB REQUISITION (OUTPATIENT)
Dept: LAB | Facility: HOSPITAL | Age: 51
End: 2021-04-19

## 2021-04-19 DIAGNOSIS — Z11.52 ENCOUNTER FOR SCREENING FOR COVID-19: ICD-10-CM

## 2021-04-19 LAB — SARS-COV-2 N GENE RESP QL NAA+PROBE: NOT DETECTED

## 2021-04-19 PROCEDURE — 87635 SARS-COV-2 COVID-19 AMP PRB: CPT | Performed by: FAMILY MEDICINE

## 2021-04-22 ENCOUNTER — HOSPITAL ENCOUNTER (OUTPATIENT)
Dept: SLEEP MEDICINE | Facility: HOSPITAL | Age: 51
Discharge: HOME | End: 2021-04-22
Admitting: NURSE PRACTITIONER

## 2021-04-22 DIAGNOSIS — G47.33 OBSTRUCTIVE SLEEP APNEA, ADULT: ICD-10-CM

## 2021-04-22 PROCEDURE — 95811 POLYSOM 6/>YRS CPAP 4/> PARM: CPT

## 2021-04-22 PROCEDURE — 95811 POLYSOM 6/>YRS CPAP 4/> PARM: CPT | Performed by: PSYCHIATRY & NEUROLOGY

## 2021-04-24 DIAGNOSIS — G47.33 OSA TREATED WITH BIPAP: Primary | ICD-10-CM

## 2021-05-06 ENCOUNTER — OFFICE VISIT (OUTPATIENT)
Dept: SLEEP MEDICINE | Facility: HOSPITAL | Age: 51
End: 2021-05-06

## 2021-05-06 VITALS — HEIGHT: 72 IN | WEIGHT: 302 LBS | BODY MASS INDEX: 40.9 KG/M2

## 2021-05-06 DIAGNOSIS — G47.19 EXCESSIVE DAYTIME SLEEPINESS: ICD-10-CM

## 2021-05-06 DIAGNOSIS — G47.33 OBSTRUCTIVE SLEEP APNEA, ADULT: Primary | ICD-10-CM

## 2021-05-06 DIAGNOSIS — F51.04 PSYCHOPHYSIOLOGICAL INSOMNIA: ICD-10-CM

## 2021-05-06 PROCEDURE — 99441 PR PHYS/QHP TELEPHONE EVALUATION 5-10 MIN: CPT | Performed by: NURSE PRACTITIONER

## 2021-05-06 NOTE — PROGRESS NOTES
Sleep Clinic Follow Up Study Results - Telephone Visit      You have chosen to receive care through a telephone visit. Do you consent to use a telephone visit for your medical care today? Yes    Date: 5/6/2021  Primary Care Provider: Drea Armenta MD    Last office visit: 03/18/2021 (I reviewed this note)    CC: Follow up: In lab BiPAP titration study      Interim History:  Since the last visit:    Joel Patton is a 50 y.o. male.  We discussed the results of the recent CPAP titration performed on 04/22/2021.  He was titrated on pressures of IPAP 14/ EPAP 9 up to IPAP 22 / EPAP 13 cm H2O. At the most optimal setting, 19/12, patient had good control of apneic events but still had some air leak. The patient had a periodic limb movement index of 0.  The patient did not have any significant cardiac arrhythmias. O2 yin of 87% with no sustained hypoxia.      PAP Data:  Unable to monitor due to being resident at [facility] and using facility's machine and supplies    Bed time routine unchanged since previous visit.      PMHx, FH, SH reviewed and pertinent changes are: Reportedly unchanged from last office visit       REVIEW OF SYSTEMS:   Negative for chest pain, SOA, fever, chills, cough, N/V/D, abdominal pain.    Smoking:none    Joel Patton  reports that he has quit smoking. He has never used smokeless tobacco.         Exam:  Unable to perform physical exam due to conducting telephone visit    Patient's (Body mass index is 40.96 kg/m².) indicates that they are morbidly obese (BMI > 40 or > 35 with obesity - related health condition) with obesity-related health conditions that include obstructive sleep apnea, hypertension, dyslipidemias and GERD . Obesity is unchanged. BMI is is above average; BMI management plan is completed. We discussed portion control and increasing exercise.        Past Medical History:   Diagnosis Date   • Anxiety    • Depression    • GERD (gastroesophageal reflux disease)    • High  blood pressure    • Sleep apnea    • Stroke (CMS/HCC) 09/2018    L side paralysis       Current Outpatient Medications:   •  acetaminophen (TYLENOL) 650 MG 8 hr tablet, Take 650 mg by mouth., Disp: , Rfl:   •  amLODIPine (NORVASC) 10 MG tablet, Take 10 mg by mouth., Disp: , Rfl:   •  atorvastatin (LIPITOR) 80 MG tablet, , Disp: , Rfl:   •  busPIRone (BUSPAR) 5 MG tablet, , Disp: , Rfl:   •  diclofenac (VOLTAREN) 1 % gel gel, Apply 2 g topically to the appropriate area as directed., Disp: , Rfl:   •  FLUoxetine (PROzac) 40 MG capsule, Take 50 mg by mouth Daily., Disp: , Rfl:   •  gabapentin (NEURONTIN) 300 MG capsule, Take 300 mg by mouth., Disp: , Rfl:   •  lactulose (CHRONULAC) 10 GM/15ML solution, , Disp: , Rfl:   •  linaclotide (LINZESS) 290 MCG capsule capsule, Take 290 mcg by mouth Every Morning Before Breakfast., Disp: , Rfl:   •  lisinopril (PRINIVIL,ZESTRIL) 40 MG tablet, , Disp: , Rfl:   •  lubiprostone (AMITIZA) 8 MCG capsule, Take 1 capsule by mouth 2 (Two) Times a Day With Meals., Disp: 60 capsule, Rfl: 5  •  melatonin 3 MG tablet, Take 15 mg by mouth At Night As Needed., Disp: , Rfl:   •  modafinil (PROVIGIL) 100 MG tablet, Take 200 mg by mouth Daily., Disp: , Rfl:   •  pantoprazole (PROTONIX) 40 MG EC tablet, Take 40 mg by mouth., Disp: , Rfl:   •  phenazopyridine (PYRIDIUM) 100 MG tablet, Take 100 mg by mouth 3 (Three) Times a Day As Needed for Bladder Spasms., Disp: , Rfl:   •  potassium chloride 10 MEQ CR tablet, , Disp: , Rfl:   •  senna (SENOKOT) 8.6 MG tablet tablet, Take  by mouth Every Night., Disp: , Rfl:   •  vitamin D (ERGOCALCIFEROL) 1.25 MG (25057 UT) capsule capsule, Take 50,000 Units by mouth 1 (One) Time Per Week., Disp: , Rfl:      WBC   Date Value Ref Range Status   12/03/2020 5.44 3.40 - 10.80 10*3/mm3 Final   09/13/2019 12 (H) 4.0 - 11.0 10*3/uL Final     RBC   Date Value Ref Range Status   12/03/2020 4.65 4.14 - 5.80 10*6/mm3 Final   09/13/2019 5.09 4.73 - 5.49 10*6/uL Final          Hemoglobin   Date Value Ref Range Status   12/03/2020 14.6 13.0 - 17.7 g/dL Final   09/13/2019 15.8 14.4 - 16.6 g/dL Final     Hematocrit   Date Value Ref Range Status   12/03/2020 42.8 37.5 - 51.0 % Final   09/13/2019 46.8 42.9 - 49.1 % Final     MCV   Date Value Ref Range Status   12/03/2020 92.0 79.0 - 97.0 fL Final   09/13/2019 92 85 - 95 fL Final     MCH   Date Value Ref Range Status   12/03/2020 31.4 26.6 - 33.0 pg Final   09/13/2019 31 28.0 - 32.0 pg Final     MCHC   Date Value Ref Range Status   12/03/2020 34.1 31.5 - 35.7 g/dL Final   09/13/2019 33.8 32.0 - 34.0 g/dL Final     RDW   Date Value Ref Range Status   12/03/2020 12.8 12.3 - 15.4 % Final   09/13/2019 46.3 37 - 54 fL Final     RDW-SD   Date Value Ref Range Status   12/03/2020 42.5 37.0 - 54.0 fl Final     MPV   Date Value Ref Range Status   12/03/2020 10.2 6.0 - 12.0 fL Final   09/13/2019 11 8.0 - 12.0 fL Final     Platelets   Date Value Ref Range Status   12/03/2020 267 140 - 450 10*3/mm3 Final   09/13/2019 312 150 - 450 10*3/uL Final     Neutrophil %   Date Value Ref Range Status   12/03/2020 46.5 42.7 - 76.0 % Final     Lymphocyte Rel %   Date Value Ref Range Status   09/13/2019 31.1 5 - 55 % Final     Lymphocyte %   Date Value Ref Range Status   12/03/2020 40.4 19.6 - 45.3 % Final     Monocyte Rel %   Date Value Ref Range Status   09/13/2019 7.2 3 - 8 % Final     Monocyte %   Date Value Ref Range Status   12/03/2020 7.4 5.0 - 12.0 % Final     Eosinophil Rel %   Date Value Ref Range Status   09/13/2019 6.2 (H) 0 - 5 % Final     Eosinophil %   Date Value Ref Range Status   12/03/2020 4.4 0.3 - 6.2 % Final     Basophil Rel %   Date Value Ref Range Status   09/13/2019 1 0 - 2 % Final     Basophil %   Date Value Ref Range Status   12/03/2020 1.1 0.0 - 1.5 % Final     Immature Grans %   Date Value Ref Range Status   12/03/2020 0.2 0.0 - 0.5 % Final   09/13/2019 54.1 45 - 80 % Final     Neutrophils, Absolute   Date Value Ref Range Status    12/03/2020 2.53 1.70 - 7.00 10*3/mm3 Final     Lymphocytes, Absolute   Date Value Ref Range Status   12/03/2020 2.20 0.70 - 3.10 10*3/mm3 Final     Monocytes, Absolute   Date Value Ref Range Status   12/03/2020 0.40 0.10 - 0.90 10*3/mm3 Final     Eosinophils, Absolute   Date Value Ref Range Status   12/03/2020 0.24 0.00 - 0.40 10*3/mm3 Final     Basophils, Absolute   Date Value Ref Range Status   12/03/2020 0.06 0.00 - 0.20 10*3/mm3 Final     Immature Grans, Absolute   Date Value Ref Range Status   12/03/2020 0.01 0.00 - 0.05 10*3/mm3 Final     nRBC   Date Value Ref Range Status   12/03/2020 0.0 0.0 - 0.2 /100 WBC Final       Lab Results   Component Value Date    GLUCOSE 134 (H) 12/03/2020    BUN 13 12/03/2020    CREATININE 1.20 12/03/2020    EGFRIFNONA 65 12/03/2020    BCR 10.8 12/03/2020    K 3.7 12/03/2020    CO2 24.0 12/03/2020    CALCIUM 9.0 12/03/2020    ALBUMIN 4.20 12/03/2020    AST 28 12/03/2020    ALT 44 (H) 12/03/2020         Assessment and Plan:    1. Obstructive sleep apnea - Established, stable (1)  1. The sleep results were discussed in detail with the patient  2. Will send orders for care team at SNF to adjust BiPAP settings to IPAP of 19 / EPAP of 12 cm H2O - it does not appear that O2 is necessary to be bled into the PAP at night  3. Follow up as needed with further questions or if further order clarification is needed  2. Insomnia - sleep onset and or maintenance - Established, stable (1)  3. Excessive daytime sleepiness - Established, stable (1)   1. Continue modafinil at discretion of PCP    All of the patient's questions were answered. He states understanding and agreement with my assessment and plan as above.     This visit has been rescheduled as a phone visit to comply with patient safety concerns in accordance with CDC recommendations. Total time of discussion was 5 minutes.    I spent 8 minutes caring for Joel on this date of service. This time includes time spent by me in the  following activities: preparing for the visit, reviewing tests, obtaining and/or reviewing a separately obtained history, counseling and educating the patient/family/caregiver, documenting information in the medical record and care coordination; discussing PAP therapy and PAP compliance      RTC in 12 months. Patient agrees to return sooner if changes in symptoms.      This document has been electronically signed by ANGEL Suarez on May 6, 2021 13:38 CDT          CC: Drea Armenta MD          No ref. provider found

## 2021-12-15 ENCOUNTER — LAB REQUISITION (OUTPATIENT)
Dept: LAB | Facility: HOSPITAL | Age: 51
End: 2021-12-15

## 2021-12-15 DIAGNOSIS — R41.4 NEUROLOGIC NEGLECT SYNDROME: ICD-10-CM

## 2021-12-15 PROCEDURE — 80306 DRUG TEST PRSMV INSTRMNT: CPT | Performed by: FAMILY MEDICINE

## 2022-03-10 ENCOUNTER — APPOINTMENT (OUTPATIENT)
Dept: GENERAL RADIOLOGY | Facility: HOSPITAL | Age: 52
End: 2022-03-10

## 2022-03-10 ENCOUNTER — APPOINTMENT (OUTPATIENT)
Dept: CT IMAGING | Facility: HOSPITAL | Age: 52
End: 2022-03-10

## 2022-03-10 ENCOUNTER — APPOINTMENT (OUTPATIENT)
Dept: MRI IMAGING | Facility: HOSPITAL | Age: 52
End: 2022-03-10

## 2022-03-10 ENCOUNTER — HOSPITAL ENCOUNTER (EMERGENCY)
Facility: HOSPITAL | Age: 52
Discharge: HOME | End: 2022-03-10
Attending: STUDENT IN AN ORGANIZED HEALTH CARE EDUCATION/TRAINING PROGRAM | Admitting: STUDENT IN AN ORGANIZED HEALTH CARE EDUCATION/TRAINING PROGRAM

## 2022-03-10 VITALS
HEART RATE: 67 BPM | TEMPERATURE: 97.5 F | HEIGHT: 74 IN | RESPIRATION RATE: 20 BRPM | BODY MASS INDEX: 37.86 KG/M2 | WEIGHT: 295 LBS | SYSTOLIC BLOOD PRESSURE: 170 MMHG | OXYGEN SATURATION: 95 % | DIASTOLIC BLOOD PRESSURE: 81 MMHG

## 2022-03-10 DIAGNOSIS — R29.810 FACIAL DROOP: Primary | ICD-10-CM

## 2022-03-10 LAB
ABO GROUP BLD: NORMAL
ABO GROUP BLD: NORMAL
ALBUMIN SERPL-MCNC: 4.5 G/DL (ref 3.5–5.2)
ALBUMIN/GLOB SERPL: 1.5 G/DL
ALP SERPL-CCNC: 129 U/L (ref 39–117)
ALT SERPL W P-5'-P-CCNC: 30 U/L (ref 1–41)
ANION GAP SERPL CALCULATED.3IONS-SCNC: 12 MMOL/L (ref 5–15)
AST SERPL-CCNC: 18 U/L (ref 1–40)
BASOPHILS # BLD AUTO: 0.11 10*3/MM3 (ref 0–0.2)
BASOPHILS NFR BLD AUTO: 1.1 % (ref 0–1.5)
BILIRUB SERPL-MCNC: 0.5 MG/DL (ref 0–1.2)
BLD GP AB SCN SERPL QL: NEGATIVE
BUN SERPL-MCNC: 16 MG/DL (ref 6–20)
BUN/CREAT SERPL: 12.4 (ref 7–25)
CALCIUM SPEC-SCNC: 9.9 MG/DL (ref 8.6–10.5)
CHLORIDE SERPL-SCNC: 105 MMOL/L (ref 98–107)
CO2 SERPL-SCNC: 28 MMOL/L (ref 22–29)
CREAT SERPL-MCNC: 1.29 MG/DL (ref 0.76–1.27)
DEPRECATED RDW RBC AUTO: 43.4 FL (ref 37–54)
EGFRCR SERPLBLD CKD-EPI 2021: 68 ML/MIN/1.73
EOSINOPHIL # BLD AUTO: 0.54 10*3/MM3 (ref 0–0.4)
EOSINOPHIL NFR BLD AUTO: 5.3 % (ref 0.3–6.2)
ERYTHROCYTE [DISTWIDTH] IN BLOOD BY AUTOMATED COUNT: 13.2 % (ref 12.3–15.4)
GLOBULIN UR ELPH-MCNC: 3.1 GM/DL
GLUCOSE BLDC GLUCOMTR-MCNC: 120 MG/DL (ref 70–130)
GLUCOSE SERPL-MCNC: 128 MG/DL (ref 65–99)
HCT VFR BLD AUTO: 46.5 % (ref 37.5–51)
HGB BLD-MCNC: 16 G/DL (ref 13–17.7)
HOLD SPECIMEN: NORMAL
HOLD SPECIMEN: NORMAL
IMM GRANULOCYTES # BLD AUTO: 0.03 10*3/MM3 (ref 0–0.05)
IMM GRANULOCYTES NFR BLD AUTO: 0.3 % (ref 0–0.5)
INR PPP: 1.02 (ref 0.8–1.2)
LYMPHOCYTES # BLD AUTO: 2.35 10*3/MM3 (ref 0.7–3.1)
LYMPHOCYTES NFR BLD AUTO: 23.2 % (ref 19.6–45.3)
Lab: NORMAL
MCH RBC QN AUTO: 31.1 PG (ref 26.6–33)
MCHC RBC AUTO-ENTMCNC: 34.4 G/DL (ref 31.5–35.7)
MCV RBC AUTO: 90.3 FL (ref 79–97)
MONOCYTES # BLD AUTO: 0.59 10*3/MM3 (ref 0.1–0.9)
MONOCYTES NFR BLD AUTO: 5.8 % (ref 5–12)
NEUTROPHILS NFR BLD AUTO: 6.52 10*3/MM3 (ref 1.7–7)
NEUTROPHILS NFR BLD AUTO: 64.3 % (ref 42.7–76)
NRBC BLD AUTO-RTO: 0 /100 WBC (ref 0–0.2)
PLATELET # BLD AUTO: 314 10*3/MM3 (ref 140–450)
PMV BLD AUTO: 10.1 FL (ref 6–12)
POTASSIUM SERPL-SCNC: 3.9 MMOL/L (ref 3.5–5.2)
PROT SERPL-MCNC: 7.6 G/DL (ref 6–8.5)
PROTHROMBIN TIME: 13.3 SECONDS (ref 11.1–15.3)
QT INTERVAL: 442 MS
QTC INTERVAL: 444 MS
RBC # BLD AUTO: 5.15 10*6/MM3 (ref 4.14–5.8)
RH BLD: POSITIVE
RH BLD: POSITIVE
SODIUM SERPL-SCNC: 145 MMOL/L (ref 136–145)
T&S EXPIRATION DATE: NORMAL
WBC NRBC COR # BLD: 10.14 10*3/MM3 (ref 3.4–10.8)
WHOLE BLOOD HOLD SPECIMEN: NORMAL
WHOLE BLOOD HOLD SPECIMEN: NORMAL

## 2022-03-10 PROCEDURE — 85025 COMPLETE CBC W/AUTO DIFF WBC: CPT | Performed by: STUDENT IN AN ORGANIZED HEALTH CARE EDUCATION/TRAINING PROGRAM

## 2022-03-10 PROCEDURE — 93010 ELECTROCARDIOGRAM REPORT: CPT | Performed by: INTERNAL MEDICINE

## 2022-03-10 PROCEDURE — 86850 RBC ANTIBODY SCREEN: CPT | Performed by: STUDENT IN AN ORGANIZED HEALTH CARE EDUCATION/TRAINING PROGRAM

## 2022-03-10 PROCEDURE — 70498 CT ANGIOGRAPHY NECK: CPT

## 2022-03-10 PROCEDURE — 85610 PROTHROMBIN TIME: CPT | Performed by: STUDENT IN AN ORGANIZED HEALTH CARE EDUCATION/TRAINING PROGRAM

## 2022-03-10 PROCEDURE — 86900 BLOOD TYPING SEROLOGIC ABO: CPT

## 2022-03-10 PROCEDURE — 70496 CT ANGIOGRAPHY HEAD: CPT

## 2022-03-10 PROCEDURE — 71045 X-RAY EXAM CHEST 1 VIEW: CPT

## 2022-03-10 PROCEDURE — 93005 ELECTROCARDIOGRAM TRACING: CPT | Performed by: STUDENT IN AN ORGANIZED HEALTH CARE EDUCATION/TRAINING PROGRAM

## 2022-03-10 PROCEDURE — 36415 COLL VENOUS BLD VENIPUNCTURE: CPT | Performed by: STUDENT IN AN ORGANIZED HEALTH CARE EDUCATION/TRAINING PROGRAM

## 2022-03-10 PROCEDURE — 0 IOPAMIDOL PER 1 ML: Performed by: STUDENT IN AN ORGANIZED HEALTH CARE EDUCATION/TRAINING PROGRAM

## 2022-03-10 PROCEDURE — 86901 BLOOD TYPING SEROLOGIC RH(D): CPT

## 2022-03-10 PROCEDURE — 86901 BLOOD TYPING SEROLOGIC RH(D): CPT | Performed by: STUDENT IN AN ORGANIZED HEALTH CARE EDUCATION/TRAINING PROGRAM

## 2022-03-10 PROCEDURE — 96374 THER/PROPH/DIAG INJ IV PUSH: CPT

## 2022-03-10 PROCEDURE — 82962 GLUCOSE BLOOD TEST: CPT

## 2022-03-10 PROCEDURE — 25010000002 METHYLPREDNISOLONE PER 125 MG: Performed by: STUDENT IN AN ORGANIZED HEALTH CARE EDUCATION/TRAINING PROGRAM

## 2022-03-10 PROCEDURE — 80053 COMPREHEN METABOLIC PANEL: CPT | Performed by: STUDENT IN AN ORGANIZED HEALTH CARE EDUCATION/TRAINING PROGRAM

## 2022-03-10 PROCEDURE — 99284 EMERGENCY DEPT VISIT MOD MDM: CPT

## 2022-03-10 PROCEDURE — 99282 EMERGENCY DEPT VISIT SF MDM: CPT | Performed by: NURSE PRACTITIONER

## 2022-03-10 PROCEDURE — 70450 CT HEAD/BRAIN W/O DYE: CPT

## 2022-03-10 PROCEDURE — 70551 MRI BRAIN STEM W/O DYE: CPT

## 2022-03-10 PROCEDURE — 86900 BLOOD TYPING SEROLOGIC ABO: CPT | Performed by: STUDENT IN AN ORGANIZED HEALTH CARE EDUCATION/TRAINING PROGRAM

## 2022-03-10 RX ORDER — ASPIRIN 81 MG/1
81 TABLET ORAL DAILY
COMMUNITY

## 2022-03-10 RX ORDER — SODIUM CHLORIDE 0.9 % (FLUSH) 0.9 %
10 SYRINGE (ML) INJECTION AS NEEDED
Status: DISCONTINUED | OUTPATIENT
Start: 2022-03-10 | End: 2022-03-10 | Stop reason: HOSPADM

## 2022-03-10 RX ORDER — METHYLPREDNISOLONE SODIUM SUCCINATE 125 MG/2ML
125 INJECTION, POWDER, LYOPHILIZED, FOR SOLUTION INTRAMUSCULAR; INTRAVENOUS ONCE
Status: COMPLETED | OUTPATIENT
Start: 2022-03-10 | End: 2022-03-10

## 2022-03-10 RX ORDER — PREDNISONE 20 MG/1
60 TABLET ORAL DAILY
Qty: 15 TABLET | Refills: 0 | Status: SHIPPED | OUTPATIENT
Start: 2022-03-11 | End: 2022-03-16

## 2022-03-10 RX ORDER — BISACODYL 10 MG
10 SUPPOSITORY, RECTAL RECTAL DAILY
COMMUNITY

## 2022-03-10 RX ORDER — OMEPRAZOLE 20 MG/1
20 CAPSULE, DELAYED RELEASE ORAL DAILY
COMMUNITY

## 2022-03-10 RX ORDER — LANOLIN ALCOHOL/MO/W.PET/CERES
1000 CREAM (GRAM) TOPICAL DAILY
COMMUNITY

## 2022-03-10 RX ADMIN — IOPAMIDOL 90 ML: 755 INJECTION, SOLUTION INTRAVENOUS at 11:07

## 2022-03-10 RX ADMIN — METHYLPREDNISOLONE SODIUM SUCCINATE 125 MG: 125 INJECTION, POWDER, FOR SOLUTION INTRAMUSCULAR; INTRAVENOUS at 11:43

## 2022-03-10 NOTE — ED NOTES
Patient states his left facial drooping started Saturday around 1400 or 1500 in the afternoon. He noticed that he had trouble swallowing his water. He also has numbness and weakness on his left side from a previous stroke in 2019.

## 2022-03-10 NOTE — ED PROVIDER NOTES
Subjective   51-year-old male with history of stroke and residual left-sided weakness numbness comes to the ER with new onset facial asymmetry.  It started Saturday.  He has had difficulty drinking fluids with the water running out of the side of his mouth.  He denies worsening or new weakness or numbness.      History provided by:  Patient   used: No        Review of Systems   Constitutional: Negative for chills and fever.   HENT: Negative for congestion and rhinorrhea.    Respiratory: Negative for cough and shortness of breath.    Cardiovascular: Negative for chest pain and palpitations.   Gastrointestinal: Negative for abdominal pain and nausea.   Skin: Negative for color change and rash.   Neurological: Positive for facial asymmetry, weakness (chronic) and numbness (chronic). Negative for dizziness and headaches.   Psychiatric/Behavioral: Negative for agitation. The patient is not nervous/anxious.        Past Medical History:   Diagnosis Date   • Anxiety    • Depression    • GERD (gastroesophageal reflux disease)    • High blood pressure    • Sleep apnea    • Stroke (HCC) 09/2018    L side paralysis       Allergies   Allergen Reactions   • Morphine Hallucinations       Past Surgical History:   Procedure Laterality Date   • BACK SURGERY     • CARDIAC SURGERY  09/2019    loop recorder   • CARPAL TUNNEL RELEASE     • COLONOSCOPY N/A 2/25/2020    Procedure: COLONOSCOPY;  Surgeon: Yaron Martínez MD;  Location: [facility] ENDOSCOPY;  Service: Gastroenterology;  Laterality: N/A;   • ENDOSCOPY N/A 2/25/2020    Procedure: ESOPHAGOGASTRODUODENOSCOPY;  Surgeon: Yaron Martínez MD;  Location: [facility] ENDOSCOPY;  Service: Gastroenterology;  Laterality: N/A;   • NECK SURGERY         Family History   Problem Relation Age of Onset   • Hypertension Mother    • Hypertension Father    • Cancer Paternal Grandfather    • Heart defect Paternal Grandfather        Social History     Socioeconomic History   • Marital  "status:    Tobacco Use   • Smoking status: Former Smoker   • Smokeless tobacco: Never Used   Substance and Sexual Activity   • Alcohol use: No   • Drug use: No   • Sexual activity: Defer           Objective    Vitals:    03/10/22 0855 03/10/22 0931 03/10/22 0940 03/10/22 0946   BP: 140/83 162/74  149/72   Pulse: 62 64 62 64   Resp: 14      Temp: 97.5 °F (36.4 °C)      SpO2: 96% 96% 96%    Weight: 134 kg (295 lb)      Height: 188 cm (74\")          Physical Exam  Vitals and nursing note reviewed.   Constitutional:       General: He is not in acute distress.     Appearance: He is well-developed. He is obese. He is not ill-appearing, toxic-appearing or diaphoretic.   HENT:      Head: Normocephalic.      Right Ear: External ear normal.      Left Ear: External ear normal.   Pulmonary:      Effort: Pulmonary effort is normal. No accessory muscle usage or respiratory distress.      Breath sounds: No wheezing.   Chest:      Chest wall: No tenderness.   Abdominal:      Palpations: Abdomen is soft.      Tenderness: There is no abdominal tenderness (deep palpation).   Skin:     General: Skin is warm and dry.   Neurological:      Mental Status: He is alert and oriented to person, place, and time.      GCS: GCS eye subscore is 4. GCS verbal subscore is 5. GCS motor subscore is 6.      Cranial Nerves: Facial asymmetry present. No dysarthria.      Sensory: Sensory deficit (left) present.      Motor: Weakness (left) present.   Psychiatric:         Behavior: Behavior normal.         Procedures           ED Course      Results for orders placed or performed during the hospital encounter of 03/10/22   Comprehensive Metabolic Panel    Specimen: Blood   Result Value Ref Range    Glucose 128 (H) 65 - 99 mg/dL    BUN 16 6 - 20 mg/dL    Creatinine 1.29 (H) 0.76 - 1.27 mg/dL    Sodium 145 136 - 145 mmol/L    Potassium 3.9 3.5 - 5.2 mmol/L    Chloride 105 98 - 107 mmol/L    CO2 28.0 22.0 - 29.0 mmol/L    Calcium 9.9 8.6 - 10.5 mg/dL    " Total Protein 7.6 6.0 - 8.5 g/dL    Albumin 4.50 3.50 - 5.20 g/dL    ALT (SGPT) 30 1 - 41 U/L    AST (SGOT) 18 1 - 40 U/L    Alkaline Phosphatase 129 (H) 39 - 117 U/L    Total Bilirubin 0.5 0.0 - 1.2 mg/dL    Globulin 3.1 gm/dL    A/G Ratio 1.5 g/dL    BUN/Creatinine Ratio 12.4 7.0 - 25.0    Anion Gap 12.0 5.0 - 15.0 mmol/L    eGFR 68.0 >60.0 mL/min/1.73   Protime-INR    Specimen: Blood   Result Value Ref Range    Protime 13.3 11.1 - 15.3 Seconds    INR 1.02 0.80 - 1.20   CBC Auto Differential    Specimen: Blood   Result Value Ref Range    WBC 10.14 3.40 - 10.80 10*3/mm3    RBC 5.15 4.14 - 5.80 10*6/mm3    Hemoglobin 16.0 13.0 - 17.7 g/dL    Hematocrit 46.5 37.5 - 51.0 %    MCV 90.3 79.0 - 97.0 fL    MCH 31.1 26.6 - 33.0 pg    MCHC 34.4 31.5 - 35.7 g/dL    RDW 13.2 12.3 - 15.4 %    RDW-SD 43.4 37.0 - 54.0 fl    MPV 10.1 6.0 - 12.0 fL    Platelets 314 140 - 450 10*3/mm3    Neutrophil % 64.3 42.7 - 76.0 %    Lymphocyte % 23.2 19.6 - 45.3 %    Monocyte % 5.8 5.0 - 12.0 %    Eosinophil % 5.3 0.3 - 6.2 %    Basophil % 1.1 0.0 - 1.5 %    Immature Grans % 0.3 0.0 - 0.5 %    Neutrophils, Absolute 6.52 1.70 - 7.00 10*3/mm3    Lymphocytes, Absolute 2.35 0.70 - 3.10 10*3/mm3    Monocytes, Absolute 0.59 0.10 - 0.90 10*3/mm3    Eosinophils, Absolute 0.54 (H) 0.00 - 0.40 10*3/mm3    Basophils, Absolute 0.11 0.00 - 0.20 10*3/mm3    Immature Grans, Absolute 0.03 0.00 - 0.05 10*3/mm3    nRBC 0.0 0.0 - 0.2 /100 WBC   POC Glucose Once    Specimen: Blood   Result Value Ref Range    Glucose 120 70 - 130 mg/dL   Type & Screen    Specimen: Blood   Result Value Ref Range    ABO Type O     RH type Positive     Antibody Screen Negative     T&S Expiration Date 3/13/2022 11:59:59 PM    PREVIOUS HISTORY    Specimen: Blood   Result Value Ref Range    Previous History No record on File    Green Top (Gel)   Result Value Ref Range    Extra Tube Hold for add-ons.    Lavender Top   Result Value Ref Range    Extra Tube hold for add-on    Gold Top -  SST   Result Value Ref Range    Extra Tube Hold for add-ons.    Light Blue Top   Result Value Ref Range    Extra Tube hold for add-on      MRI Brain Without Contrast   Final Result   Old infarcts.   Atrophy with evidence of chronic small vessel white matter   ischemic change.   No acute intracranial pathology.   See body of report for full details.      Electronically signed by:  Kvng Schmidt MD  3/10/2022 10:53 AM   CST Workstation: [id number]      XR Chest 1 View   Final Result   Grossly normal AP chest x-ray      Electronically signed by:  Kvng Schmidt MD  3/10/2022 9:49 AM   CST Workstation: [id number]      CT Head Without Contrast Stroke Protocol   Final Result   1. No acute intracranial abnormality.  MRI is pending.   2. Atrophy.   3. Small vessel ischemic disease.   4. Large area of encephalomalacia within the right MCA   distribution which is unchanged from prior.      Electronically signed by:  Jasbir Mosley MD  3/10/2022 9:48 AM CST   Workstation: [id number]      CT Angiogram Head w AI Analysis of LVO    (Results Pending)   CT Angiogram Neck    (Results Pending)           MDM  Number of Diagnoses or Management Options  Facial droop: new and requires workup  Diagnosis management comments: Vital signs are stable, afebrile.  Labs are unremarkable.  CT of the head negative for acute intracranial abnormalities.  Chronic right MCA distribution encephalomalacia.  MRI of the brain negative for acute findings.  Patient likely has Bell's palsy.  Neurologist concurs.  Steroids given and will call in a prescription of prednisone.  Recommend follow-up with PCP.  Return precautions provided.      Final diagnoses:   Facial droop       ED Disposition  ED Disposition     ED Disposition   Discharge    Condition   Stable    Comment   --             No follow-up provider specified.       Medication List      New Prescriptions    predniSONE 20 MG tablet  Commonly known as: DELTASONE  Take 3 tablets by mouth Daily for 5 days.  Start  taking on: March 11, 2022           Where to Get Your Medications      Information about where to get these medications is not yet available    Ask your nurse or doctor about these medications  · predniSONE 20 MG tablet          Michael Yusuf MD  03/10/22 [id number]

## 2022-03-10 NOTE — ED NOTES
Per EMS they are en route with patient with stroke like symptoms that started Saturday. Pt is from [facility]. Has been having facial numbness and facial drooping, trouble swallowing water. /77, HR 62, O2 97%RA.

## 2022-03-10 NOTE — CONSULTS
Stroke Consult Note    Patient Name: Joel Patton   MRN: [id number]  Age: 51 y.o.  Sex: male  : 1970    Primary Care Physician: Drea Armenta MD  Referring Physician:  Michael Yusuf MD    TIME STROKE TEAM CALLED: 9:00 CST     TIME PATIENT SEEN: 9:10 CST    Handedness: Right  Race: White    Chief Complaint/Reason for Consultation: Left facial weakness    HPI: Pt is a 51-yr-old right-handed white male with known diagnosis of right MCA stroke in 2019 with left-side residual on aspirin 325 mg and Lipitor 80 mg/day, and hypertension who presented with left facial weakness since yesterday and states he has had difficulty drinking from a straw since Saturday. Upon exam he has left facial asymmetry, left eye and eyebrow weakness which he states is from previous stroke but not that bad and never had a facial droop, left arm is flaccid with contracted hand, some movement of left leg but cannot maintain and drifts, states decreased sensation on left face, arm, and leg which is baseline, visual field is intact, and no neglect noted.     Last Known Normal Date/Time: 3/5/22    Review of Systems   HENT: Negative.    Eyes: Negative.    Respiratory: Negative.    Cardiovascular: Negative.    Gastrointestinal: Negative.    Genitourinary: Negative.    Musculoskeletal: Negative.    Skin: Negative.    Neurological: Positive for facial asymmetry, weakness and numbness.   Psychiatric/Behavioral: Negative.         Temp:  [97.5 °F (36.4 °C)] 97.5 °F (36.4 °C)  Heart Rate:  [62-64] 64  Resp:  [14] 14  BP: (140-162)/(74-83) 162/74    Neurological Exam  Mental Status  Awake and alert. Oriented to person, place, time and situation. dysarthria present. Language is fluent with no aphasia. Attention and concentration are normal.    Cranial Nerves  CN II: Visual acuity is normal. Visual fields full to confrontation.  CN III, IV, VI: Extraocular movements intact bilaterally. Normal lids and orbits bilaterally. Pupils equal  round and reactive to light bilaterally.  CN V:  Left: Diminished sensation of the entire left side of the face.  CN VII:  Left: There is central facial weakness.  CN IX, X: Palate elevates symmetrically. Normal gag reflex.  CN XI: Shoulder shrug strength is normal.  CN XII: Tongue midline without atrophy or fasciculations.    Motor  Decreased muscle bulk throughout. Left hemiparesis.    Sensory  Light touch abnormality: Left face, arm, and leg.     Reflexes  Not assessed.    Coordination    Finger-to-nose, rapid alternating movements and heel-to-shin normal bilaterally without dysmetria.    Gait    Not assessed.      Physical Exam  Vitals and nursing note reviewed.   Constitutional:       General: He is awake.      Appearance: He is obese.   HENT:      Head: Normocephalic and atraumatic.   Eyes:      General: Lids are normal.      Extraocular Movements: Extraocular movements intact.      Pupils: Pupils are equal, round, and reactive to light.   Cardiovascular:      Rate and Rhythm: Normal rate.   Pulmonary:      Effort: Pulmonary effort is normal.   Musculoskeletal:         General: Normal range of motion.      Cervical back: Normal range of motion.   Skin:     General: Skin is warm and dry.   Neurological:      Mental Status: He is alert.      Cranial Nerves: Cranial nerve deficit and dysarthria present.      Sensory: Sensory deficit present.      Motor: Weakness present.      Coordination: Coordination is intact.   Psychiatric:         Mood and Affect: Mood normal.         Acute Stroke Data    Alteplase (tPA) Inclusion / Exclusion Criteria    Time: 09:46 CST  Person Administering Scale: ANGEL Hernandez    Inclusion Criteria  [x]   18 years of age or greater   []   Onset of symptoms < 4.5 hours before beginning treatment (stroke onset = time patient was last seen well or without symptoms).   []   Diagnosis of acute ischemic stroke causing measurable disabling deficit (Complete Hemianopia, Any Aphasia, Visual or  Sensory Extinction, Any weakness limiting sustained effort against gravity)   []   Any remaining deficit considered potentially disabling in view of patient and practitioner   Exclusion criteria (Do not proceed with Alteplase if any are checked under exclusion criteria)  [x]   Onset unknown or GREATER than 4.5 hours   []   ICH on CT/MRI   []   CT demonstrates hypodensity representing acute or subacute infarct   []   Significant head trauma or prior stroke in the previous 3 months   []   Symptoms suggestive of subarachnoid hemorrhage   []   History of un-ruptured intracranial aneurysm GREATER than 10 mm   []   Recent intracranial or intraspinal surgery within the last 3 months   []   Arterial puncture at a non-compressible site in the previous 7 days   []   Active internal bleeding   []   Acute bleeding tendency   []   Platelet count LESS than 100,000 for known hematological diseases such as leukemia, thrombocytopenia or chronic cirrhosis   []   Current use of anticoagulant with INR GREATER than 1.7 or PT GREATER than 15 seconds, aPTT GREATER than 40 seconds   []   Heparin received within 48 hours, resulting in abnormally elevated aPTT GREATER than upper limit of normal   []   Current use of direct thrombin inhibitors or direct factor Xa inhibitors in the past 48 hours   []   Elevated blood pressure refractory to treatment (systolic GREATER than 185 mm/Hg or diastolic  GREATER than 110 mm/Hg   []   Suspected infective endocarditis and aortic arch dissection   []   Current use of therapeutic treatment dose of low-molecular-weight heparin (LMWH) within the previous 24 hours   []   Structural GI malignancy or bleed   Relative exclusion for all patients  []   Only minor non-disabling symptoms   []   Pregnancy   []   Seizure at onset with postictal residual neurological impairments   []   Major surgery or previous trauma within past 14 days   []   History of previous spontaneous ICH, intracranial neoplasm, or AV  malformation   []   Postpartum (within previous 14 days)   []   Recent GI or urinary tract hemorrhage (within previous 21 days)   []   Recent acute MI (within previous 3 months)   []   History of un-ruptured intracranial aneurysm LESS than 10 mm   []   History of ruptured intracranial aneurysm   []   Blood glucose LESS than 50 mg/dL (2.7 mmol/L)   []   Dural puncture within the last 7 days   []   Known GREATER than 10 cerebral microbleeds   Additional exclusions for patients with symptoms onset between 3 and 4.5 hours.  []   Age > 80.   []   On any anticoagulants regardless of INR  >>> Warfarin (Coumadin), Heparin, Enoxaparin (Lovenox), fondaparinux (Arixtra), bivalirudin (Angiomax), Argatroban, dabigatran (Pradaxa), rivaroxaban (Xarelto), or apixaban (Eliquis)   []   Severe stroke (NIHSS > 25).   []   History of BOTH diabetes and previous ischemic stroke.   []   The risks and benefits have been discussed with the patient or family related to the administration of IV Alteplase for stroke symptoms.   []   I have discussed and reviewed the patient's case and imaging with the attending prior to IV Alteplase.   N/A Time Alteplase administered       Past Medical History:   Diagnosis Date   • Anxiety    • Depression    • GERD (gastroesophageal reflux disease)    • High blood pressure    • Sleep apnea    • Stroke (HCC) 09/2018    L side paralysis     Past Surgical History:   Procedure Laterality Date   • BACK SURGERY     • CARDIAC SURGERY  09/2019    loop recorder   • CARPAL TUNNEL RELEASE     • COLONOSCOPY N/A 2/25/2020    Procedure: COLONOSCOPY;  Surgeon: Yaron Martínez MD;  Location: [facility] ENDOSCOPY;  Service: Gastroenterology;  Laterality: N/A;   • ENDOSCOPY N/A 2/25/2020    Procedure: ESOPHAGOGASTRODUODENOSCOPY;  Surgeon: Yaron Martínez MD;  Location: [facility] ENDOSCOPY;  Service: Gastroenterology;  Laterality: N/A;   • NECK SURGERY       Family History   Problem Relation Age of Onset   • Hypertension Mother    •  Hypertension Father    • Cancer Paternal Grandfather    • Heart defect Paternal Grandfather      Social History     Socioeconomic History   • Marital status:    Tobacco Use   • Smoking status: Former Smoker   • Smokeless tobacco: Never Used   Substance and Sexual Activity   • Alcohol use: No   • Drug use: No   • Sexual activity: Defer     Allergies   Allergen Reactions   • Morphine Hallucinations     Prior to Admission medications    Medication Sig Start Date End Date Taking? Authorizing Provider   aspirin 81 MG EC tablet Take 81 mg by mouth Daily.   Yes Collin Velazquez MD   atorvastatin (LIPITOR) 80 MG tablet  2/15/21  Yes ProviderCollin MD   benzocaine (ORAJEL) 10 % mucosal gel Apply  to the mouth or throat As Needed for Mucositis.   Yes Collin Velazquez MD   bisacodyl (Bisacodyl Laxative) 10 MG suppository Insert 10 mg into the rectum Daily.   Yes Collin Velazquez MD   busPIRone (BUSPAR) 5 MG tablet  2/1/21  Yes Collin Velazquez MD   diclofenac (VOLTAREN) 1 % gel gel Apply 2 g topically to the appropriate area as directed.   Yes Collin Velazquez MD   FLUoxetine (PROzac) 40 MG capsule Take 50 mg by mouth Daily.   Yes Collin Velazquez MD   gabapentin (NEURONTIN) 300 MG capsule Take 300 mg by mouth. 11/7/19  Yes Collin Velazquez MD   lactulose (CHRONULAC) 10 GM/15ML solution  3/11/21  Yes Collin Velazquez MD   linaclotide (LINZESS) 290 MCG capsule capsule Take 290 mcg by mouth Every Morning Before Breakfast.   Yes Collin Velazquez MD   lisinopril (PRINIVIL,ZESTRIL) 40 MG tablet  2/11/21  Yes ProviderCollin MD   melatonin 3 MG tablet Take 15 mg by mouth At Night As Needed.   Yes Collin Velazquez MD   modafinil (PROVIGIL) 100 MG tablet Take 200 mg by mouth Daily.   Yes Collin Velazquez MD   omeprazole (priLOSEC) 20 MG capsule Take 20 mg by mouth Daily.   Yes Collin Velazquez MD   pantoprazole (PROTONIX) 40 MG EC tablet Take 40 mg by  mouth.   Yes Collin Velazquez MD   phenazopyridine (PYRIDIUM) 100 MG tablet Take 100 mg by mouth 3 (Three) Times a Day As Needed for Bladder Spasms.   Yes Collin Velazquez MD   potassium chloride 10 MEQ CR tablet  21  Yes Collin Velazquez MD   senna (SENOKOT) 8.6 MG tablet tablet Take  by mouth Every Night.   Yes Collin Velazquez MD   vitamin B-12 (CYANOCOBALAMIN) 1000 MCG tablet Take 1,000 mcg by mouth Daily.   Yes Collin Velazquez MD   vitamin D (ERGOCALCIFEROL) 1.25 MG (54224 UT) capsule capsule Take 50,000 Units by mouth 1 (One) Time Per Week.   Yes Collin Velazquez MD   acetaminophen (TYLENOL) 650 MG 8 hr tablet Take 650 mg by mouth.    Collin Velazquez MD   amLODIPine (NORVASC) 10 MG tablet Take 10 mg by mouth. 17   Collin Velazquez MD   lubiprostone (AMITIZA) 8 MCG capsule Take 1 capsule by mouth 2 (Two) Times a Day With Meals. 3/3/20   [facility] Meds:  Scheduled-    Infusions-     PRNs- •  sodium chloride    Functional Status Prior to Current Stroke/[place] Score: 4    NIH Stroke Scale  Time: 09:46 CST  Person Administering Scale: ANGEL Hernandez    1a  Level of consciousness: 0=alert; keenly responsive   1b. LOC questions:  0=Performs both tasks correctly   1c. LOC commands: 0=Performs both tasks correctly   2.  Best Gaze: 0=normal   3.  Visual: 0=No visual loss   4. Facial Palsy: 2=Partial paralysis (total or near total paralysis of the lower face)   5a.  Motor left arm: 4=No movement   5b.  Motor right arm: 0=No drift, limb holds 90 (or 45) degrees for full 10 seconds   6a. motor left le=Drift, limb holds 90 (or 45) degrees but drifts down before full 10 seconds: does not hit bed   6b  Motor right le=No drift, limb holds 90 (or 45) degrees for full 10 seconds   7. Limb Ataxia: 0=Absent   8.  Sensory: 1=Mild to moderate sensory loss; patient feels pinprick is less sharp or is dull on the affected side; there is a loss of  superficial pain with pinprick but patient is aware He is being touched   9. Best Language:  0=No aphasia, normal   10. Dysarthria: 1=Mild to moderate, patient slurs at least some words and at worst, can be understood with some difficulty   11. Extinction and Inattention: 0=No abnormality    Total:   9       Results Reviewed:  I have personally reviewed current lab, radiology, and data and agree with results.  Lab Results (last 24 hours)     Procedure Component Value Units Date/Time    Comprehensive Metabolic Panel [[id number]]  (Abnormal) Collected: 03/10/22 0902    Specimen: Blood Updated: 03/10/22 0935     Glucose 128 mg/dL      BUN 16 mg/dL      Creatinine 1.29 mg/dL      Sodium 145 mmol/L      Potassium 3.9 mmol/L      Chloride 105 mmol/L      CO2 28.0 mmol/L      Calcium 9.9 mg/dL      Total Protein 7.6 g/dL      Albumin 4.50 g/dL      ALT (SGPT) 30 U/L      AST (SGOT) 18 U/L      Alkaline Phosphatase 129 U/L      Total Bilirubin 0.5 mg/dL      Globulin 3.1 gm/dL      A/G Ratio 1.5 g/dL      BUN/Creatinine Ratio 12.4     Anion Gap 12.0 mmol/L      eGFR 68.0 mL/min/1.73      Comment: National Kidney Foundation and American Society of Nephrology (ASN) Task Force recommended calculation based on the Chronic Kidney Disease Epidemiology Collaboration (CKD-EPI) equation refit without adjustment for race.       Narrative:      GFR Normal >60  Chronic Kidney Disease <60  Kidney Failure <15      Protime-INR [[id number]]  (Normal) Collected: 03/10/22 0902    Specimen: Blood Updated: 03/10/22 0930     Protime 13.3 Seconds      INR 1.02    Narrative:      Therapeutic range for most indications is 2.0-3.0 INR,  or 2.5-3.5 for mechanical heart valves.    POC Glucose Once [[id number]]  (Normal) Collected: 03/10/22 0855    Specimen: Blood Updated: 03/10/22 0926     Glucose 120 mg/dL      Comment: RN NotifiedOperator: [id number] PALOMO Montanez ID: [id number]       CBC & Differential [[id number]]  (Abnormal) Collected:  03/10/22 0902    Specimen: Blood Updated: 03/10/22 0915    Narrative:      The following orders were created for panel order CBC & Differential.  Procedure                               Abnormality         Status                     ---------                               -----------         ------                     CBC Auto Differential[[id number]]        Abnormal            Final result                 Please view results for these tests on the individual orders.    CBC Auto Differential [[id number]]  (Abnormal) Collected: 03/10/22 0902    Specimen: Blood Updated: 03/10/22 0915     WBC 10.14 10*3/mm3      RBC 5.15 10*6/mm3      Hemoglobin 16.0 g/dL      Hematocrit 46.5 %      MCV 90.3 fL      MCH 31.1 pg      MCHC 34.4 g/dL      RDW 13.2 %      RDW-SD 43.4 fl      MPV 10.1 fL      Platelets 314 10*3/mm3      Neutrophil % 64.3 %      Lymphocyte % 23.2 %      Monocyte % 5.8 %      Eosinophil % 5.3 %      Basophil % 1.1 %      Immature Grans % 0.3 %      Neutrophils, Absolute 6.52 10*3/mm3      Lymphocytes, Absolute 2.35 10*3/mm3      Monocytes, Absolute 0.59 10*3/mm3      Eosinophils, Absolute 0.54 10*3/mm3      Basophils, Absolute 0.11 10*3/mm3      Immature Grans, Absolute 0.03 10*3/mm3      nRBC 0.0 /100 WBC     Light Blue Top [[id number]] Collected: 03/10/22 0902    Specimen: Blood Updated: 03/10/22 0911    Gold Top - SST [[id number]] Collected: 03/10/22 0902    Specimen: Blood Updated: 03/10/22 0911    Lavender Top [[id number]] Collected: 03/10/22 0902    Specimen: Blood Updated: 03/10/22 0911    [place] Draw [[id number]] Collected: 03/10/22 0902    Specimen: Blood Updated: 03/10/22 0911    Narrative:      The following orders were created for panel order [place] Draw.  Procedure                               Abnormality         Status                     ---------                               -----------         ------                     Green Top (Gel)[[id number]]                                  In process                  Lavender Top[[id number]]                                     In process                 Gold Top - SST[[id number]]                                   In process                 Light Blue Top[[id number]]                                   In process                   Please view results for these tests on the individual orders.    Green Top (Gel) [[id number]] Collected: 03/10/22 0902    Specimen: Blood Updated: 03/10/22 0911        Imaging Results (Last 24 Hours)     Procedure Component Value Units Date/Time    XR Chest 1 View [[id number]] Resulted: 03/10/22 0927     Updated: 03/10/22 0936    CT Head Without Contrast Stroke Protocol [[id number]] Resulted: 03/10/22 0909     Updated: 03/10/22 0922            Assessment/Plan: Pt is a 51-yr-old right-handed white male with known diagnosis of right MCA stroke in 2019 with left-side residual on aspirin 325 mg and Lipitor 80 mg/day, and hypertension who presented with left facial weakness since yesterday and states he has had difficulty drinking from a straw since Saturday. Upon exam he has left facial asymmetry, left eye and eyebrow weakness which he states is from previous stroke but not that bad and never had a facial droop, left arm is flaccid with contracted hand, some movement of left leg but cannot maintain and drifts, states decreased sensation on left face, arm, and leg which is baseline, visual field is intact, and no neglect noted.   1. Left facial droop- CT shows old right MCA encephalomalacia and no acute process. MRI neg for acute stroke, likely Bell's palsy. Will give Medrol 125 mg IV then prednisone 60 mg/day for five days. Okay to d/c back to SNF.  Case was discussed with pt, pt's mom, Dr. Moore, ER physician, and nursing. Thank you for the consult.                 ANGEL Hernandez  March 10, 2022  09:46 CST              I spent 60 minutes caring for Joel  on this date of service. This time includes time spent by me in the following activities:  reviewing tests, obtaining and/or reviewing a separately obtained history, performing a medically appropriate examination and/or evaluation, counseling and educating the patient/family/caregiver, ordering medications, tests, or procedures, referring and communicating with other health care professionals, documenting information in the medical record, independently interpreting results and communicating that information with the patient/family/caregiver and care coordination

## 2022-03-29 ENCOUNTER — HOSPITAL ENCOUNTER (EMERGENCY)
Facility: HOSPITAL | Age: 52
Discharge: NURSING, REHAB OR LONG-TERM CARE | End: 2022-03-30
Attending: EMERGENCY MEDICINE | Admitting: EMERGENCY MEDICINE

## 2022-03-29 DIAGNOSIS — G47.00 INSOMNIA, UNSPECIFIED TYPE: Primary | ICD-10-CM

## 2022-03-29 DIAGNOSIS — F41.9 ANXIETY: ICD-10-CM

## 2022-03-29 PROCEDURE — 99283 EMERGENCY DEPT VISIT LOW MDM: CPT

## 2022-03-30 VITALS
OXYGEN SATURATION: 95 % | SYSTOLIC BLOOD PRESSURE: 118 MMHG | HEART RATE: 78 BPM | BODY MASS INDEX: 42.23 KG/M2 | HEIGHT: 70 IN | TEMPERATURE: 97.9 F | WEIGHT: 295 LBS | RESPIRATION RATE: 20 BRPM | DIASTOLIC BLOOD PRESSURE: 62 MMHG

## 2022-03-30 LAB
HOLD SPECIMEN: NORMAL
WHOLE BLOOD HOLD SPECIMEN: NORMAL
WHOLE BLOOD HOLD SPECIMEN: NORMAL

## 2022-08-04 ENCOUNTER — APPOINTMENT (OUTPATIENT)
Dept: MRI IMAGING | Facility: HOSPITAL | Age: 52
End: 2022-08-04

## 2022-08-10 ENCOUNTER — HOSPITAL ENCOUNTER (OUTPATIENT)
Dept: MRI IMAGING | Facility: HOSPITAL | Age: 52
Discharge: HOME | End: 2022-08-10
Admitting: PSYCHIATRY & NEUROLOGY

## 2022-08-10 DIAGNOSIS — I67.82 CEREBRAL ISCHEMIA: ICD-10-CM

## 2022-08-10 PROCEDURE — 70551 MRI BRAIN STEM W/O DYE: CPT

## 2023-09-20 ENCOUNTER — LAB (OUTPATIENT)
Dept: LAB | Facility: HOSPITAL | Age: 53
End: 2023-09-20
Payer: MEDICARE

## 2023-09-20 ENCOUNTER — OFFICE VISIT (OUTPATIENT)
Dept: GASTROENTEROLOGY | Facility: CLINIC | Age: 53
End: 2023-09-20
Payer: MEDICARE

## 2023-09-20 VITALS
HEIGHT: 70 IN | SYSTOLIC BLOOD PRESSURE: 130 MMHG | HEART RATE: 65 BPM | DIASTOLIC BLOOD PRESSURE: 85 MMHG | WEIGHT: 298.2 LBS | BODY MASS INDEX: 42.69 KG/M2

## 2023-09-20 DIAGNOSIS — R10.13 EPIGASTRIC PAIN: ICD-10-CM

## 2023-09-20 DIAGNOSIS — K21.00 GASTROESOPHAGEAL REFLUX DISEASE WITH ESOPHAGITIS WITHOUT HEMORRHAGE: ICD-10-CM

## 2023-09-20 DIAGNOSIS — K76.0 FATTY LIVER: Primary | ICD-10-CM

## 2023-09-20 LAB
INR PPP: 1.01 (ref 0.8–1.2)
PROTHROMBIN TIME: 13.3 SECONDS (ref 11.1–15.3)

## 2023-09-20 PROCEDURE — 3079F DIAST BP 80-89 MM HG: CPT | Performed by: NURSE PRACTITIONER

## 2023-09-20 PROCEDURE — 1159F MED LIST DOCD IN RCRD: CPT | Performed by: NURSE PRACTITIONER

## 2023-09-20 PROCEDURE — 85027 COMPLETE CBC AUTOMATED: CPT | Performed by: NURSE PRACTITIONER

## 2023-09-20 PROCEDURE — 80053 COMPREHEN METABOLIC PANEL: CPT | Performed by: NURSE PRACTITIONER

## 2023-09-20 PROCEDURE — 1160F RVW MEDS BY RX/DR IN RCRD: CPT | Performed by: NURSE PRACTITIONER

## 2023-09-20 PROCEDURE — 85610 PROTHROMBIN TIME: CPT | Performed by: NURSE PRACTITIONER

## 2023-09-20 PROCEDURE — 3075F SYST BP GE 130 - 139MM HG: CPT | Performed by: NURSE PRACTITIONER

## 2023-09-20 PROCEDURE — 99214 OFFICE O/P EST MOD 30 MIN: CPT | Performed by: NURSE PRACTITIONER

## 2023-09-20 PROCEDURE — 36415 COLL VENOUS BLD VENIPUNCTURE: CPT | Performed by: NURSE PRACTITIONER

## 2023-09-20 RX ORDER — ICOSAPENT ETHYL 1000 MG/1
CAPSULE ORAL
COMMUNITY
Start: 2023-07-17

## 2023-09-20 RX ORDER — POTASSIUM CHLORIDE 750 MG/1
10 TABLET, EXTENDED RELEASE ORAL DAILY
COMMUNITY
End: 2023-09-20

## 2023-09-20 RX ORDER — IBUPROFEN 800 MG/1
TABLET ORAL
COMMUNITY
Start: 2023-09-14

## 2023-09-20 RX ORDER — TRAZODONE HYDROCHLORIDE 50 MG/1
TABLET ORAL
COMMUNITY
Start: 2023-09-03

## 2023-09-20 RX ORDER — DEXTROSE AND SODIUM CHLORIDE 5; .45 G/100ML; G/100ML
30 INJECTION, SOLUTION INTRAVENOUS CONTINUOUS PRN
OUTPATIENT
Start: 2023-09-20

## 2023-09-20 RX ORDER — FAMOTIDINE 20 MG/1
TABLET, FILM COATED ORAL
COMMUNITY
Start: 2023-08-23

## 2023-09-20 NOTE — PROGRESS NOTES
Chief Complaint   Patient presents with    fatty liver       Subjective    Joel Patton is a 52 y.o. male. he is here today for follow-up.                                                                  Assessment & Plan                                     1. Fatty liver    2. Epigastric pain    3. Gastroesophageal reflux disease with esophagitis without hemorrhage      Plan; schedule patient for EGD due to epigastric tenderness on exam chronic GERD history of esophagitis and gastritis we will obtain biopsy to further evaluate epigastric pain.  We will obtain lab work for surveillance due to fatty liver noted on imaging discussed importance of diet lifestyle modifications  We will obtain HIDA scan due to right upper quadrant tenderness rule out biliary dyskinesia.    Follow-up: No follow-ups on file.     HPI  52-year-old male presents to discuss fatty liver.  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  Reports in skilled nursing facility he began having severe right-sided abdominal pain.  Underwent ultrasound which noted fatty enlarged liver gallbladder appeared normal he denies any significant nausea or change in bowel movements has had issues with constipation chronically was last seen in GI clinic 2020 for follow-up after colonoscopy at that time he was placed on Motegrity report it worked well for constipation however insurance would not cover we tried Amitiza which did not improve symptoms he has been taking Linzess and reports he is kostas to have a bowel movement once per week.  Reports a good appetite previous EGD noted gastritis and esophagitis.  Lab work was not sent for review.    Review of Systems  Review of Systems   Constitutional:  Negative for activity change, appetite change, chills,  "diaphoresis, fatigue, fever and unexpected weight change.   HENT:  Negative for sore throat and trouble swallowing.    Respiratory:  Negative for shortness of breath.    Gastrointestinal:  Positive for abdominal pain and constipation (on linzess has bm 1 time week). Negative for abdominal distention, anal bleeding, blood in stool, diarrhea, nausea, rectal pain and vomiting.   Musculoskeletal:  Negative for arthralgias.   Skin:  Negative for pallor.   Neurological:  Negative for light-headedness.     /85 (BP Location: Right arm)   Pulse 65   Ht 177.8 cm (70\")   Wt 135 kg (298 lb 3.2 oz)   BMI 42.79 kg/m²     Objective      Physical Exam  Pulmonary:      Effort: Pulmonary effort is normal.   Abdominal:      General: Abdomen is flat. Bowel sounds are normal. There is no distension.      Palpations: Abdomen is soft. There is no mass.      Tenderness: There is abdominal tenderness in the epigastric area and left upper quadrant.             The following portions of the patient's history were reviewed and updated as appropriate:   Past Medical History:   Diagnosis Date    Anxiety     Depression     GERD (gastroesophageal reflux disease)     High blood pressure     Sleep apnea     Stroke 09/2018    L side paralysis     Past Surgical History:   Procedure Laterality Date    BACK SURGERY      CARDIAC SURGERY  09/2019    loop recorder    CARPAL TUNNEL RELEASE      COLONOSCOPY N/A 2/25/2020    Procedure: COLONOSCOPY;  Surgeon: Yaron Martínez MD;  Location: [facility] ENDOSCOPY;  Service: Gastroenterology;  Laterality: N/A;    ENDOSCOPY N/A 2/25/2020    Procedure: ESOPHAGOGASTRODUODENOSCOPY;  Surgeon: Yaron Martínez MD;  Location: [facility] ENDOSCOPY;  Service: Gastroenterology;  Laterality: N/A;    NECK SURGERY       Family History   Problem Relation Age of Onset    Hypertension Mother     Hypertension Father     Colon cancer Paternal Grandmother     Cancer Paternal Grandfather     Heart defect Paternal Grandfather  "       Allergies   Allergen Reactions    Morphine Hallucinations     Social History     Socioeconomic History    Marital status:    Tobacco Use    Smoking status: Former    Smokeless tobacco: Never   Substance and Sexual Activity    Alcohol use: No    Drug use: Yes     Frequency: 1.0 times per week     Types: Marijuana    Sexual activity: Defer     Current Medications:  Prior to Admission medications    Medication Sig Start Date End Date Taking? Authorizing Provider   acetaminophen (TYLENOL) 650 MG 8 hr tablet Take 1 tablet by mouth.   Yes Collin Velazquez MD   amLODIPine (NORVASC) 10 MG tablet Take 1 tablet by mouth. 5/26/17  Yes Collin Velazquez MD   aspirin 81 MG EC tablet Take 1 tablet by mouth Daily.   Yes Collin Velazquez MD   atorvastatin (LIPITOR) 80 MG tablet  2/15/21  Yes Collin Velazquez MD   bisacodyl (DULCOLAX) 10 MG suppository Insert 1 suppository into the rectum Daily.   Yes Collin Velazquez MD   busPIRone (BUSPAR) 5 MG tablet  2/1/21  Yes Collin Velazquez MD   Cholecalciferol 125 MCG (5000 UT) tablet Take 1 tablet by mouth Daily.   Yes Collin Velazquez MD   diclofenac (VOLTAREN) 1 % gel gel Apply 2 g topically to the appropriate area as directed.   Yes Collin Velazquez MD   famotidine (PEPCID) 20 MG tablet  8/23/23  Yes Collin Velazquez MD   FLUoxetine (PROzac) 40 MG capsule Take 50 mg by mouth Daily.   Yes Collin Velazquez MD   gabapentin (NEURONTIN) 300 MG capsule Take 1 capsule by mouth. 11/7/19  Yes Collin Velazquez MD   ibuprofen (ADVIL,MOTRIN) 800 MG tablet  9/14/23  Yes Collin Velazquez MD   icosapent ethyl (VASCEPA) 1 g capsule capsule  7/17/23  Yes Collin Velazquez MD   lactulose (CHRONULAC) 10 GM/15ML solution  3/11/21  Yes Collin Velazquez MD   linaclotide (LINZESS) 290 MCG capsule capsule Take 1 capsule by mouth Every Morning Before Breakfast.   Yes Collin Velazquez MD   lisinopril (PRINIVIL,ZESTRIL) 40 MG  tablet  2/11/21  Yes Collin Velazquez MD   melatonin 3 MG tablet Take 5 tablets by mouth At Night As Needed.   Yes Collin Velazquez MD   modafinil (PROVIGIL) 100 MG tablet Take 2 tablets by mouth Daily.   Yes Collin Velazquez MD   phenazopyridine (PYRIDIUM) 100 MG tablet Take 1 tablet by mouth 3 (Three) Times a Day As Needed for Bladder Spasms.   Yes Collin Velazquez MD   potassium chloride 10 MEQ CR tablet  1/31/21  Yes Collin Velazquez MD   senna (SENOKOT) 8.6 MG tablet tablet Take  by mouth Every Night.   Yes Collin Velazquez MD   traZODone (DESYREL) 50 MG tablet  9/3/23  Yes Collin Velazquez MD   vitamin B-12 (CYANOCOBALAMIN) 1000 MCG tablet Take 1 tablet by mouth Daily.   Yes Collin Velazquez MD   vitamin D (ERGOCALCIFEROL) 1.25 MG (97289 UT) capsule capsule Take 1 capsule by mouth 1 (One) Time Per Week.   Yes Collin Velazquez MD   benzocaine (ORAJEL) 10 % mucosal gel Apply  to the mouth or throat As Needed for Mucositis.  Patient not taking: Reported on 9/20/2023    Collin Velazquez MD   lubiprostone (AMITIZA) 8 MCG capsule Take 1 capsule by mouth 2 (Two) Times a Day With Meals.  Patient not taking: Reported on 9/20/2023 3/3/20   Liana Fulton APRN   potassium chloride (K-DUR,KLOR-CON) 10 MEQ CR tablet Take 1 tablet by mouth Daily.  Patient not taking: Reported on 9/20/2023    Collin Velazquez MD   omeprazole (priLOSEC) 20 MG capsule Take 20 mg by mouth Daily.  9/20/23  Collin Velzaquez MD   pantoprazole (PROTONIX) 40 MG EC tablet Take 40 mg by mouth.  9/20/23  Collin Velazquez MD     Orders placed during this encounter include:  Orders Placed This Encounter   Procedures    NM Hepatobiliary Without CCK     Standing Status:   Future     Standing Expiration Date:   9/20/2024     Order Specific Question:   Do you want ejection fraction for this procedure?     Answer:   Yes     Order Specific Question:   Release to patient     Answer:   Routine  Release [[id number]]    Comprehensive Metabolic Panel     Order Specific Question:   Release to patient     Answer:   Routine Release [[id number]]    CBC (No Diff)     Order Specific Question:   Release to patient     Answer:   Routine Release [[id number]]    Protime-INR     Order Specific Question:   Release to patient     Answer:   Routine Release [[id number]]    Obtain Informed Consent     Standing Status:   Future     Order Specific Question:   Informed Consent Given For     Answer:   ESOPHAGOGASTRODUODENOSCOPY possible dilation     ESOPHAGOGASTRODUODENOSCOPY possible dilation (N/A)  No orders of the defined types were placed in this encounter.        Review and/or summary of lab tests, radiology, procedures, medications. Review and summary of old records and obtaining of history. The risks and benefits of my recommendations, as well as other treatment options were discussed . Any questions/concerned were answered. Patient voiced understanding and agreement.          This document has been electronically signed by ANGEL Burgos on September 20, 2023 16:43 CDT                                               Results for orders placed or performed during the hospital encounter of 03/29/22   Gray Top   Result Value Ref Range    Extra Tube Hold for add-ons.    Gold Top - SST   Result Value Ref Range    Extra Tube Hold for add-ons.    Green Top (Gel)   Result Value Ref Range    Extra Tube Hold for add-ons.    Lavender Top   Result Value Ref Range    Extra Tube hold for add-on    Light Blue Top   Result Value Ref Range    Extra Tube hold for add-on    Results for orders placed or performed during the hospital encounter of 03/10/22   PREVIOUS HISTORY    Specimen: Blood   Result Value Ref Range    Previous History No record on File    Gold Top - SST   Result Value Ref Range    Extra Tube Hold for add-ons.    Green Top (Gel)   Result Value Ref Range    Extra Tube Hold for add-ons.    ABO RH Specimen Verification     Specimen: Blood   Result Value Ref Range    ABO Type O     RH type Positive    CBC Auto Differential    Specimen: Blood   Result Value Ref Range    WBC 10.14 3.40 - 10.80 10*3/mm3    RBC 5.15 4.14 - 5.80 10*6/mm3    Hemoglobin 16.0 13.0 - 17.7 g/dL    Hematocrit 46.5 37.5 - 51.0 %    MCV 90.3 79.0 - 97.0 fL    MCH 31.1 26.6 - 33.0 pg    MCHC 34.4 31.5 - 35.7 g/dL    RDW 13.2 12.3 - 15.4 %    RDW-SD 43.4 37.0 - 54.0 fl    MPV 10.1 6.0 - 12.0 fL    Platelets 314 140 - 450 10*3/mm3    Neutrophil % 64.3 42.7 - 76.0 %    Lymphocyte % 23.2 19.6 - 45.3 %    Monocyte % 5.8 5.0 - 12.0 %    Eosinophil % 5.3 0.3 - 6.2 %    Basophil % 1.1 0.0 - 1.5 %    Immature Grans % 0.3 0.0 - 0.5 %    Neutrophils, Absolute 6.52 1.70 - 7.00 10*3/mm3    Lymphocytes, Absolute 2.35 0.70 - 3.10 10*3/mm3    Monocytes, Absolute 0.59 0.10 - 0.90 10*3/mm3    Eosinophils, Absolute 0.54 (H) 0.00 - 0.40 10*3/mm3    Basophils, Absolute 0.11 0.00 - 0.20 10*3/mm3    Immature Grans, Absolute 0.03 0.00 - 0.05 10*3/mm3    nRBC 0.0 0.0 - 0.2 /100 WBC   Lavender Top   Result Value Ref Range    Extra Tube hold for add-on    Light Blue Top   Result Value Ref Range    Extra Tube hold for add-on    Protime-INR    Specimen: Blood   Result Value Ref Range    Protime 13.3 11.1 - 15.3 Seconds    INR 1.02 0.80 - 1.20   POC Glucose Once    Specimen: Blood   Result Value Ref Range    Glucose 120 70 - 130 mg/dL   Type & Screen    Specimen: Blood   Result Value Ref Range    ABO Type O     RH type Positive     Antibody Screen Negative     T&S Expiration Date 3/13/2022 11:59:59 PM    Comprehensive Metabolic Panel    Specimen: Blood   Result Value Ref Range    Glucose 128 (H) 65 - 99 mg/dL    BUN 16 6 - 20 mg/dL    Creatinine 1.29 (H) 0.76 - 1.27 mg/dL    Sodium 145 136 - 145 mmol/L    Potassium 3.9 3.5 - 5.2 mmol/L    Chloride 105 98 - 107 mmol/L    CO2 28.0 22.0 - 29.0 mmol/L    Calcium 9.9 8.6 - 10.5 mg/dL    Total Protein 7.6 6.0 - 8.5 g/dL    Albumin 4.50 3.50 - 5.20  g/dL    ALT (SGPT) 30 1 - 41 U/L    AST (SGOT) 18 1 - 40 U/L    Alkaline Phosphatase 129 (H) 39 - 117 U/L    Total Bilirubin 0.5 0.0 - 1.2 mg/dL    Globulin 3.1 gm/dL    A/G Ratio 1.5 g/dL    BUN/Creatinine Ratio 12.4 7.0 - 25.0    Anion Gap 12.0 5.0 - 15.0 mmol/L    eGFR 68.0 >60.0 mL/min/1.73   ECG 12 Lead   Result Value Ref Range    QT Interval 442 ms    QTC Interval 444 ms   Results for orders placed or performed in visit on 12/15/21   Urine Drug Screen - Urine, Clean Catch    Specimen: Urine, Clean Catch   Result Value Ref Range    THC, Screen, Urine Positive (A) Negative    Phencyclidine (PCP), Urine Negative Negative    Cocaine Screen, Urine Negative Negative    Methamphetamine, Ur Negative Negative    Opiate Screen Negative Negative    Amphetamine Screen, Urine Negative Negative    Benzodiazepine Screen, Urine Negative Negative    Tricyclic Antidepressants Screen Negative Negative    Methadone Screen, Urine Negative Negative    Barbiturates Screen, Urine Negative Negative    Oxycodone Screen, Urine Negative Negative    Propoxyphene Screen Negative Negative    Buprenorphine, Screen, Urine Negative Negative   Results for orders placed or performed in visit on 04/19/21   COVID-19, [facility] IN-HOUSE, NP SWAB IN TRANSPORT MEDIA 8-10 HR TAT - Swab, Nasopharynx    Specimen: Nasopharynx; Swab   Result Value Ref Range    COVID19 Not Detected Not Detected - Ref. Range   Results for orders placed or performed during the hospital encounter of 12/03/20   Lactic Acid, Reflex Timer (This will reflex a repeat order 3-3:15 hours after ordered.)    Specimen: Blood   Result Value Ref Range    Hold Tube Hold for add-ons.    Lactic Acid, Reflex    Specimen: Blood   Result Value Ref Range    Lactate 1.0 0.5 - 2.0 mmol/L   Gold Top - SST   Result Value Ref Range    Extra Tube Hold for add-ons.    Green Top (Gel)   Result Value Ref Range    Extra Tube Hold for add-ons.    Procalcitonin    Specimen: Blood   Result Value Ref Range     Procalcitonin 0.06 0.00 - 0.25 ng/mL   CBC Auto Differential    Specimen: Blood   Result Value Ref Range    WBC 5.44 3.40 - 10.80 10*3/mm3    RBC 4.65 4.14 - 5.80 10*6/mm3    Hemoglobin 14.6 13.0 - 17.7 g/dL    Hematocrit 42.8 37.5 - 51.0 %    MCV 92.0 79.0 - 97.0 fL    MCH 31.4 26.6 - 33.0 pg    MCHC 34.1 31.5 - 35.7 g/dL    RDW 12.8 12.3 - 15.4 %    RDW-SD 42.5 37.0 - 54.0 fl    MPV 10.2 6.0 - 12.0 fL    Platelets 267 140 - 450 10*3/mm3    Neutrophil % 46.5 42.7 - 76.0 %    Lymphocyte % 40.4 19.6 - 45.3 %    Monocyte % 7.4 5.0 - 12.0 %    Eosinophil % 4.4 0.3 - 6.2 %    Basophil % 1.1 0.0 - 1.5 %    Immature Grans % 0.2 0.0 - 0.5 %    Neutrophils, Absolute 2.53 1.70 - 7.00 10*3/mm3    Lymphocytes, Absolute 2.20 0.70 - 3.10 10*3/mm3    Monocytes, Absolute 0.40 0.10 - 0.90 10*3/mm3    Eosinophils, Absolute 0.24 0.00 - 0.40 10*3/mm3    Basophils, Absolute 0.06 0.00 - 0.20 10*3/mm3    Immature Grans, Absolute 0.01 0.00 - 0.05 10*3/mm3    nRBC 0.0 0.0 - 0.2 /100 WBC     *Note: Due to a large number of results and/or encounters for the requested time period, some results have not been displayed. A complete set of results can be found in Results Review.

## 2023-09-21 LAB
ALBUMIN SERPL-MCNC: 4 G/DL (ref 3.5–5.2)
ALBUMIN/GLOB SERPL: 1.2 G/DL
ALP SERPL-CCNC: 112 U/L (ref 39–117)
ALT SERPL W P-5'-P-CCNC: 31 U/L (ref 1–41)
ANION GAP SERPL CALCULATED.3IONS-SCNC: 10 MMOL/L (ref 5–15)
AST SERPL-CCNC: 23 U/L (ref 1–40)
BILIRUB SERPL-MCNC: 0.2 MG/DL (ref 0–1.2)
BUN SERPL-MCNC: 21 MG/DL (ref 6–20)
BUN/CREAT SERPL: 13.2 (ref 7–25)
CALCIUM SPEC-SCNC: 9.7 MG/DL (ref 8.6–10.5)
CHLORIDE SERPL-SCNC: 104 MMOL/L (ref 98–107)
CO2 SERPL-SCNC: 26 MMOL/L (ref 22–29)
CREAT SERPL-MCNC: 1.59 MG/DL (ref 0.76–1.27)
DEPRECATED RDW RBC AUTO: 43.4 FL (ref 37–54)
EGFRCR SERPLBLD CKD-EPI 2021: 52.6 ML/MIN/1.73
ERYTHROCYTE [DISTWIDTH] IN BLOOD BY AUTOMATED COUNT: 13.3 % (ref 12.3–15.4)
GLOBULIN UR ELPH-MCNC: 3.4 GM/DL
GLUCOSE SERPL-MCNC: 87 MG/DL (ref 65–99)
HCT VFR BLD AUTO: 40.4 % (ref 37.5–51)
HGB BLD-MCNC: 14.6 G/DL (ref 13–17.7)
MCH RBC QN AUTO: 32.1 PG (ref 26.6–33)
MCHC RBC AUTO-ENTMCNC: 36.1 G/DL (ref 31.5–35.7)
MCV RBC AUTO: 88.8 FL (ref 79–97)
PLATELET # BLD AUTO: 291 10*3/MM3 (ref 140–450)
PMV BLD AUTO: 10.6 FL (ref 6–12)
POTASSIUM SERPL-SCNC: 3.6 MMOL/L (ref 3.5–5.2)
PROT SERPL-MCNC: 7.4 G/DL (ref 6–8.5)
RBC # BLD AUTO: 4.55 10*6/MM3 (ref 4.14–5.8)
SODIUM SERPL-SCNC: 140 MMOL/L (ref 136–145)
WBC NRBC COR # BLD: 8.25 10*3/MM3 (ref 3.4–10.8)

## 2024-06-07 ENCOUNTER — OUTSIDE FACILITY SERVICE (OUTPATIENT)
Dept: PULMONOLOGY | Facility: CLINIC | Age: 54
End: 2024-06-07
Payer: MEDICARE

## 2024-06-08 ENCOUNTER — OUTSIDE FACILITY SERVICE (OUTPATIENT)
Dept: PULMONOLOGY | Facility: CLINIC | Age: 54
End: 2024-06-08
Payer: MEDICARE

## 2024-06-09 ENCOUNTER — OUTSIDE FACILITY SERVICE (OUTPATIENT)
Dept: PULMONOLOGY | Facility: CLINIC | Age: 54
End: 2024-06-09
Payer: MEDICARE

## 2024-06-10 ENCOUNTER — OUTSIDE FACILITY SERVICE (OUTPATIENT)
Dept: PULMONOLOGY | Facility: CLINIC | Age: 54
End: 2024-06-10
Payer: MEDICARE

## 2024-06-11 ENCOUNTER — OUTSIDE FACILITY SERVICE (OUTPATIENT)
Dept: PULMONOLOGY | Facility: CLINIC | Age: 54
End: 2024-06-11
Payer: MEDICARE

## 2024-06-12 ENCOUNTER — OUTSIDE FACILITY SERVICE (OUTPATIENT)
Dept: PULMONOLOGY | Facility: CLINIC | Age: 54
End: 2024-06-12
Payer: MEDICARE

## 2024-06-12 PROCEDURE — 99232 SBSQ HOSP IP/OBS MODERATE 35: CPT | Performed by: INTERNAL MEDICINE

## 2024-06-13 ENCOUNTER — OUTSIDE FACILITY SERVICE (OUTPATIENT)
Dept: PULMONOLOGY | Facility: CLINIC | Age: 54
End: 2024-06-13
Payer: MEDICARE

## 2024-06-13 PROCEDURE — 99232 SBSQ HOSP IP/OBS MODERATE 35: CPT | Performed by: INTERNAL MEDICINE

## 2024-06-14 ENCOUNTER — OUTSIDE FACILITY SERVICE (OUTPATIENT)
Dept: PULMONOLOGY | Facility: CLINIC | Age: 54
End: 2024-06-14
Payer: MEDICARE

## 2024-06-14 PROCEDURE — 99233 SBSQ HOSP IP/OBS HIGH 50: CPT | Performed by: INTERNAL MEDICINE

## 2024-06-15 ENCOUNTER — OUTSIDE FACILITY SERVICE (OUTPATIENT)
Dept: PULMONOLOGY | Facility: CLINIC | Age: 54
End: 2024-06-15
Payer: MEDICARE

## 2024-06-15 PROCEDURE — 99233 SBSQ HOSP IP/OBS HIGH 50: CPT | Performed by: INTERNAL MEDICINE

## 2024-06-16 ENCOUNTER — OUTSIDE FACILITY SERVICE (OUTPATIENT)
Dept: PULMONOLOGY | Facility: CLINIC | Age: 54
End: 2024-06-16
Payer: MEDICARE

## 2024-06-16 PROCEDURE — 99233 SBSQ HOSP IP/OBS HIGH 50: CPT | Performed by: INTERNAL MEDICINE

## 2024-06-17 ENCOUNTER — OUTSIDE FACILITY SERVICE (OUTPATIENT)
Dept: PULMONOLOGY | Facility: CLINIC | Age: 54
End: 2024-06-17
Payer: MEDICARE

## 2024-06-17 PROCEDURE — 99232 SBSQ HOSP IP/OBS MODERATE 35: CPT | Performed by: INTERNAL MEDICINE

## 2024-06-18 ENCOUNTER — OUTSIDE FACILITY SERVICE (OUTPATIENT)
Dept: PULMONOLOGY | Facility: CLINIC | Age: 54
End: 2024-06-18
Payer: MEDICARE

## 2024-06-18 PROCEDURE — 99232 SBSQ HOSP IP/OBS MODERATE 35: CPT | Performed by: INTERNAL MEDICINE

## 2024-06-19 ENCOUNTER — OUTSIDE FACILITY SERVICE (OUTPATIENT)
Dept: PULMONOLOGY | Facility: CLINIC | Age: 54
End: 2024-06-19
Payer: MEDICARE

## 2024-06-19 PROCEDURE — 99232 SBSQ HOSP IP/OBS MODERATE 35: CPT | Performed by: INTERNAL MEDICINE

## 2024-06-20 ENCOUNTER — OUTSIDE FACILITY SERVICE (OUTPATIENT)
Dept: PULMONOLOGY | Facility: CLINIC | Age: 54
End: 2024-06-20
Payer: MEDICARE

## 2024-06-20 PROCEDURE — 99232 SBSQ HOSP IP/OBS MODERATE 35: CPT | Performed by: INTERNAL MEDICINE

## 2024-06-21 ENCOUNTER — OUTSIDE FACILITY SERVICE (OUTPATIENT)
Dept: PULMONOLOGY | Facility: CLINIC | Age: 54
End: 2024-06-21
Payer: MEDICARE

## 2024-06-21 PROCEDURE — 99232 SBSQ HOSP IP/OBS MODERATE 35: CPT | Performed by: INTERNAL MEDICINE

## 2024-06-22 ENCOUNTER — OUTSIDE FACILITY SERVICE (OUTPATIENT)
Dept: PULMONOLOGY | Facility: CLINIC | Age: 54
End: 2024-06-22
Payer: MEDICARE

## 2024-06-22 PROCEDURE — 99232 SBSQ HOSP IP/OBS MODERATE 35: CPT | Performed by: INTERNAL MEDICINE

## 2024-06-23 ENCOUNTER — OUTSIDE FACILITY SERVICE (OUTPATIENT)
Dept: PULMONOLOGY | Facility: CLINIC | Age: 54
End: 2024-06-23
Payer: MEDICARE

## 2024-06-23 PROCEDURE — 99232 SBSQ HOSP IP/OBS MODERATE 35: CPT | Performed by: INTERNAL MEDICINE

## 2024-06-24 ENCOUNTER — OUTSIDE FACILITY SERVICE (OUTPATIENT)
Dept: PULMONOLOGY | Facility: CLINIC | Age: 54
End: 2024-06-24
Payer: MEDICARE

## 2024-06-24 PROCEDURE — 99232 SBSQ HOSP IP/OBS MODERATE 35: CPT | Performed by: INTERNAL MEDICINE

## 2024-06-25 ENCOUNTER — OUTSIDE FACILITY SERVICE (OUTPATIENT)
Dept: PULMONOLOGY | Facility: CLINIC | Age: 54
End: 2024-06-25
Payer: MEDICARE

## 2024-06-26 ENCOUNTER — OUTSIDE FACILITY SERVICE (OUTPATIENT)
Dept: PULMONOLOGY | Facility: CLINIC | Age: 54
End: 2024-06-26
Payer: MEDICARE

## 2024-06-26 PROCEDURE — 99232 SBSQ HOSP IP/OBS MODERATE 35: CPT | Performed by: INTERNAL MEDICINE

## 2024-06-27 ENCOUNTER — OUTSIDE FACILITY SERVICE (OUTPATIENT)
Dept: PULMONOLOGY | Facility: CLINIC | Age: 54
End: 2024-06-27
Payer: MEDICARE

## 2024-06-28 ENCOUNTER — OUTSIDE FACILITY SERVICE (OUTPATIENT)
Dept: PULMONOLOGY | Facility: CLINIC | Age: 54
End: 2024-06-28
Payer: MEDICARE

## 2024-06-29 ENCOUNTER — OUTSIDE FACILITY SERVICE (OUTPATIENT)
Dept: PULMONOLOGY | Facility: CLINIC | Age: 54
End: 2024-06-29
Payer: MEDICARE

## 2024-06-30 ENCOUNTER — OUTSIDE FACILITY SERVICE (OUTPATIENT)
Dept: PULMONOLOGY | Facility: CLINIC | Age: 54
End: 2024-06-30
Payer: MEDICARE

## 2024-07-01 ENCOUNTER — OUTSIDE FACILITY SERVICE (OUTPATIENT)
Dept: PULMONOLOGY | Facility: CLINIC | Age: 54
End: 2024-07-01
Payer: MEDICARE

## 2024-07-02 ENCOUNTER — OUTSIDE FACILITY SERVICE (OUTPATIENT)
Dept: PULMONOLOGY | Facility: CLINIC | Age: 54
End: 2024-07-02
Payer: MEDICARE

## 2024-07-03 ENCOUNTER — OUTSIDE FACILITY SERVICE (OUTPATIENT)
Dept: PULMONOLOGY | Facility: CLINIC | Age: 54
End: 2024-07-03
Payer: MEDICARE

## 2024-07-03 PROCEDURE — 99232 SBSQ HOSP IP/OBS MODERATE 35: CPT | Performed by: INTERNAL MEDICINE

## 2024-07-04 ENCOUNTER — OUTSIDE FACILITY SERVICE (OUTPATIENT)
Dept: PULMONOLOGY | Facility: CLINIC | Age: 54
End: 2024-07-04
Payer: MEDICARE

## 2024-07-04 PROCEDURE — 99232 SBSQ HOSP IP/OBS MODERATE 35: CPT | Performed by: INTERNAL MEDICINE

## (undated) DEVICE — CANN SMPL SOFTECH BIFLO ETCO2 A/M 7FT

## (undated) DEVICE — BITEBLOCK ENDO W/STRAP 60F A/ LF DISP